# Patient Record
Sex: FEMALE | Race: WHITE | Employment: PART TIME | URBAN - METROPOLITAN AREA
[De-identification: names, ages, dates, MRNs, and addresses within clinical notes are randomized per-mention and may not be internally consistent; named-entity substitution may affect disease eponyms.]

---

## 2019-01-28 ENCOUNTER — HOSPITAL ENCOUNTER (OUTPATIENT)
Age: 29
Discharge: HOME | End: 2019-01-28
Payer: COMMERCIAL

## 2019-01-28 ENCOUNTER — ROUTINE PRENATAL (OUTPATIENT)
Dept: PERINATAL CARE | Age: 29
End: 2019-01-28
Payer: COMMERCIAL

## 2019-01-28 VITALS
DIASTOLIC BLOOD PRESSURE: 80 MMHG | SYSTOLIC BLOOD PRESSURE: 122 MMHG | HEART RATE: 97 BPM | BODY MASS INDEX: 35.61 KG/M2 | WEIGHT: 214 LBS

## 2019-01-28 DIAGNOSIS — O09.292: ICD-10-CM

## 2019-01-28 DIAGNOSIS — Q89.9 LYMPHATIC MALFORMATION: ICD-10-CM

## 2019-01-28 DIAGNOSIS — N96 RECURRENT PREGNANCY LOSS: ICD-10-CM

## 2019-01-28 PROCEDURE — 85610 PROTHROMBIN TIME: CPT

## 2019-01-28 PROCEDURE — 88230 TISSUE CULTURE LYMPHOCYTE: CPT

## 2019-01-28 PROCEDURE — 76817 TRANSVAGINAL US OBSTETRIC: CPT | Performed by: OBSTETRICS & GYNECOLOGY

## 2019-01-28 PROCEDURE — 76811 OB US DETAILED SNGL FETUS: CPT | Performed by: OBSTETRICS & GYNECOLOGY

## 2019-01-28 PROCEDURE — 88262 CHROMOSOME ANALYSIS 15-20: CPT

## 2019-01-28 PROCEDURE — 85613 RUSSELL VIPER VENOM DILUTED: CPT

## 2019-01-28 PROCEDURE — 85730 THROMBOPLASTIN TIME PARTIAL: CPT

## 2019-01-28 PROCEDURE — 86147 CARDIOLIPIN ANTIBODY EA IG: CPT

## 2019-01-28 PROCEDURE — 86146 BETA-2 GLYCOPROTEIN ANTIBODY: CPT

## 2019-01-28 RX ORDER — CLONAZEPAM 0.5 MG/1
0.5 TABLET ORAL DAILY
COMMUNITY

## 2019-01-28 RX ORDER — CETIRIZINE HYDROCHLORIDE 10 MG/1
10 TABLET ORAL DAILY
COMMUNITY

## 2019-01-30 LAB
ANTICARDIOLIPIN IGG ANTIBODY: 4 GPL (ref 0–14)
BETA-2 GLYCOPROTEIN 1 IGG ANTIBODY: 1 SGU (ref 0–20)
BETA-2 GLYCOPROTEIN 1 IGM ANTIBODY: 1 SMU (ref 0–20)
CARDIOLIPIN AB IGM: 10 MPL (ref 0–12)
DRVVT CONFIRMATION TEST: ABNORMAL RATIO
DRVVT SCREEN: 32 SEC (ref 33–44)
DRVVT,DIL: ABNORMAL SEC (ref 33–44)
HEXAGONAL PHOSPHOLIPID NEUTRALIZAT TEST: ABNORMAL
LUPUS ANTICOAG INTERP: ABNORMAL
PLT NEUTA: ABNORMAL
PT D: 13 SEC (ref 12–15.5)
PTT D: 39 SEC (ref 32–48)
PTT-D CORR REFLEX: ABNORMAL SEC (ref 32–48)
PTT-HEPARIN NEUTRALIZED: ABNORMAL SEC (ref 32–48)
REPTILASE TIME: ABNORMAL SEC
THROMBIN TIME: ABNORMAL SEC (ref 14.7–19.5)

## 2019-02-05 LAB
Lab: NORMAL
REPORT: NORMAL
THIS TEST SENT TO: NORMAL

## 2019-02-11 ENCOUNTER — ROUTINE PRENATAL (OUTPATIENT)
Dept: PERINATAL CARE | Age: 29
End: 2019-02-11
Payer: COMMERCIAL

## 2019-02-11 DIAGNOSIS — O09.292: ICD-10-CM

## 2019-02-11 PROCEDURE — 76815 OB US LIMITED FETUS(S): CPT | Performed by: OBSTETRICS & GYNECOLOGY

## 2020-10-20 LAB
C. TRACHOMATIS, EXTERNAL RESULT: NEGATIVE
N. GONORRHOEAE, EXTERNAL RESULT: NEGATIVE

## 2020-11-04 LAB
ABO, EXTERNAL RESULT: NORMAL
HEP B, EXTERNAL RESULT: NEGATIVE
HIV, EXTERNAL RESULT: NON REACTIVE
RH FACTOR, EXTERNAL RESULT: POSITIVE
RPR, EXTERNAL RESULT: NON REACTIVE
RUBELLA TITER, EXTERNAL RESULT: NORMAL

## 2021-01-27 LAB — GBS, EXTERNAL RESULT: NEGATIVE

## 2021-02-19 ENCOUNTER — NURSE ONLY (OUTPATIENT)
Dept: PRIMARY CARE CLINIC | Age: 31
End: 2021-02-19
Payer: COMMERCIAL

## 2021-02-19 DIAGNOSIS — Z20.822 SUSPECTED COVID-19 VIRUS INFECTION: Primary | ICD-10-CM

## 2021-02-19 PROCEDURE — 99211 OFF/OP EST MAY X REQ PHY/QHP: CPT | Performed by: NURSE PRACTITIONER

## 2021-02-19 NOTE — PROGRESS NOTES
Griselda Carson received a viral test for COVID-19. They were educated on isolation and quarantine as appropriate. For any symptoms, they were directed to seek care from their PCP, given contact information to establish with a doctor, directed to an urgent care or the emergency room.

## 2021-02-20 LAB — SARS-COV-2: NOT DETECTED

## 2021-02-23 ENCOUNTER — ANESTHESIA EVENT (OUTPATIENT)
Dept: LABOR AND DELIVERY | Age: 31
DRG: 560 | End: 2021-02-23
Payer: COMMERCIAL

## 2021-02-23 ENCOUNTER — HOSPITAL ENCOUNTER (INPATIENT)
Age: 31
LOS: 3 days | Discharge: HOME | DRG: 560 | End: 2021-02-26
Attending: OBSTETRICS & GYNECOLOGY | Admitting: OBSTETRICS & GYNECOLOGY
Payer: COMMERCIAL

## 2021-02-23 ENCOUNTER — ANESTHESIA (OUTPATIENT)
Dept: LABOR AND DELIVERY | Age: 31
DRG: 560 | End: 2021-02-23
Payer: COMMERCIAL

## 2021-02-23 LAB
ABO/RH: NORMAL
AMPHETAMINE SCREEN, URINE: ABNORMAL
ANTIBODY SCREEN: NORMAL
BARBITURATE SCREEN URINE: ABNORMAL
BASOPHILS ABSOLUTE: 0.1 K/UL (ref 0–0.2)
BASOPHILS RELATIVE PERCENT: 0.9 %
BENZODIAZEPINE SCREEN, URINE: ABNORMAL
BUPRENORPHINE URINE: POSITIVE
CANNABINOID SCREEN URINE: ABNORMAL
COCAINE METABOLITE SCREEN URINE: ABNORMAL
EOSINOPHILS ABSOLUTE: 0.6 K/UL (ref 0–0.6)
EOSINOPHILS RELATIVE PERCENT: 7.2 %
HCT VFR BLD CALC: 33.8 % (ref 36–48)
HEMOGLOBIN: 11.2 G/DL (ref 12–16)
LYMPHOCYTES ABSOLUTE: 2.1 K/UL (ref 1–5.1)
LYMPHOCYTES RELATIVE PERCENT: 23.7 %
Lab: ABNORMAL
MCH RBC QN AUTO: 25.5 PG (ref 26–34)
MCHC RBC AUTO-ENTMCNC: 33.2 G/DL (ref 31–36)
MCV RBC AUTO: 76.7 FL (ref 80–100)
METHADONE SCREEN, URINE: ABNORMAL
MONOCYTES ABSOLUTE: 0.9 K/UL (ref 0–1.3)
MONOCYTES RELATIVE PERCENT: 9.9 %
NEUTROPHILS ABSOLUTE: 5.1 K/UL (ref 1.7–7.7)
NEUTROPHILS RELATIVE PERCENT: 58.3 %
OPIATE SCREEN URINE: ABNORMAL
OXYCODONE URINE: ABNORMAL
PDW BLD-RTO: 15.1 % (ref 12.4–15.4)
PH UA: 6
PHENCYCLIDINE SCREEN URINE: ABNORMAL
PLATELET # BLD: 265 K/UL (ref 135–450)
PMV BLD AUTO: 7.6 FL (ref 5–10.5)
PROPOXYPHENE SCREEN: ABNORMAL
RBC # BLD: 4.41 M/UL (ref 4–5.2)
WBC # BLD: 8.8 K/UL (ref 4–11)

## 2021-02-23 PROCEDURE — 6370000000 HC RX 637 (ALT 250 FOR IP): Performed by: OBSTETRICS & GYNECOLOGY

## 2021-02-23 PROCEDURE — 2580000003 HC RX 258: Performed by: OBSTETRICS & GYNECOLOGY

## 2021-02-23 PROCEDURE — 51701 INSERT BLADDER CATHETER: CPT

## 2021-02-23 PROCEDURE — 80307 DRUG TEST PRSMV CHEM ANLYZR: CPT

## 2021-02-23 PROCEDURE — 6360000002 HC RX W HCPCS: Performed by: OBSTETRICS & GYNECOLOGY

## 2021-02-23 PROCEDURE — 86850 RBC ANTIBODY SCREEN: CPT

## 2021-02-23 PROCEDURE — 86780 TREPONEMA PALLIDUM: CPT

## 2021-02-23 PROCEDURE — 3E033VJ INTRODUCTION OF OTHER HORMONE INTO PERIPHERAL VEIN, PERCUTANEOUS APPROACH: ICD-10-PCS | Performed by: OBSTETRICS & GYNECOLOGY

## 2021-02-23 PROCEDURE — 1220000000 HC SEMI PRIVATE OB R&B

## 2021-02-23 PROCEDURE — 86900 BLOOD TYPING SEROLOGIC ABO: CPT

## 2021-02-23 PROCEDURE — 3700000025 EPIDURAL BLOCK: Performed by: ANESTHESIOLOGY

## 2021-02-23 PROCEDURE — 86901 BLOOD TYPING SEROLOGIC RH(D): CPT

## 2021-02-23 PROCEDURE — 85025 COMPLETE CBC W/AUTO DIFF WBC: CPT

## 2021-02-23 PROCEDURE — 2500000003 HC RX 250 WO HCPCS: Performed by: NURSE ANESTHETIST, CERTIFIED REGISTERED

## 2021-02-23 RX ORDER — SODIUM CHLORIDE 0.9 % (FLUSH) 0.9 %
10 SYRINGE (ML) INJECTION EVERY 12 HOURS SCHEDULED
Status: DISCONTINUED | OUTPATIENT
Start: 2021-02-23 | End: 2021-02-24

## 2021-02-23 RX ORDER — BUPIVACAINE HYDROCHLORIDE 2.5 MG/ML
INJECTION, SOLUTION EPIDURAL; INFILTRATION; INTRACAUDAL PRN
Status: DISCONTINUED | OUTPATIENT
Start: 2021-02-23 | End: 2021-02-24 | Stop reason: SDUPTHER

## 2021-02-23 RX ORDER — FERROUS SULFATE 325(65) MG
325 TABLET ORAL
COMMUNITY

## 2021-02-23 RX ORDER — DOCUSATE SODIUM 100 MG/1
100 CAPSULE, LIQUID FILLED ORAL 2 TIMES DAILY
Status: DISCONTINUED | OUTPATIENT
Start: 2021-02-23 | End: 2021-02-24

## 2021-02-23 RX ORDER — ONDANSETRON HYDROCHLORIDE 8 MG/1
8 TABLET, FILM COATED ORAL EVERY 8 HOURS PRN
COMMUNITY

## 2021-02-23 RX ORDER — SODIUM CHLORIDE, SODIUM LACTATE, POTASSIUM CHLORIDE, CALCIUM CHLORIDE 600; 310; 30; 20 MG/100ML; MG/100ML; MG/100ML; MG/100ML
INJECTION, SOLUTION INTRAVENOUS CONTINUOUS
Status: DISCONTINUED | OUTPATIENT
Start: 2021-02-23 | End: 2021-02-24

## 2021-02-23 RX ORDER — ONDANSETRON 2 MG/ML
4 INJECTION INTRAMUSCULAR; INTRAVENOUS EVERY 6 HOURS PRN
Status: DISCONTINUED | OUTPATIENT
Start: 2021-02-23 | End: 2021-02-24

## 2021-02-23 RX ORDER — EPHEDRINE SULFATE 50 MG/ML
INJECTION INTRAVENOUS PRN
Status: DISCONTINUED | OUTPATIENT
Start: 2021-02-23 | End: 2021-02-24 | Stop reason: SDUPTHER

## 2021-02-23 RX ORDER — EPHEDRINE SULFATE 50 MG/ML
INJECTION INTRAVENOUS
Status: COMPLETED
Start: 2021-02-23 | End: 2021-02-23

## 2021-02-23 RX ORDER — SODIUM CHLORIDE 0.9 % (FLUSH) 0.9 %
10 SYRINGE (ML) INJECTION PRN
Status: DISCONTINUED | OUTPATIENT
Start: 2021-02-23 | End: 2021-02-24

## 2021-02-23 RX ORDER — BUPRENORPHINE HYDROCHLORIDE 8 MG/1
12 TABLET SUBLINGUAL DAILY
COMMUNITY

## 2021-02-23 RX ADMIN — SODIUM CHLORIDE, POTASSIUM CHLORIDE, SODIUM LACTATE AND CALCIUM CHLORIDE: 600; 310; 30; 20 INJECTION, SOLUTION INTRAVENOUS at 18:19

## 2021-02-23 RX ADMIN — EPHEDRINE SULFATE 25 MG: 50 INJECTION INTRAVENOUS at 22:16

## 2021-02-23 RX ADMIN — SODIUM CHLORIDE, POTASSIUM CHLORIDE, SODIUM LACTATE AND CALCIUM CHLORIDE: 600; 310; 30; 20 INJECTION, SOLUTION INTRAVENOUS at 15:16

## 2021-02-23 RX ADMIN — Medication 25 MCG: at 12:35

## 2021-02-23 RX ADMIN — Medication 15 ML/HR: at 18:24

## 2021-02-23 RX ADMIN — SODIUM CHLORIDE, POTASSIUM CHLORIDE, SODIUM LACTATE AND CALCIUM CHLORIDE: 600; 310; 30; 20 INJECTION, SOLUTION INTRAVENOUS at 18:54

## 2021-02-23 RX ADMIN — ONDANSETRON 4 MG: 2 INJECTION INTRAMUSCULAR; INTRAVENOUS at 20:41

## 2021-02-23 RX ADMIN — EPHEDRINE SULFATE 25 MG: 50 INJECTION INTRAVENOUS at 22:17

## 2021-02-23 RX ADMIN — Medication 25 MCG: at 08:29

## 2021-02-23 RX ADMIN — BUPIVACAINE HYDROCHLORIDE 5 ML: 2.5 INJECTION, SOLUTION EPIDURAL; INFILTRATION; INTRACAUDAL; PERINEURAL at 18:19

## 2021-02-23 RX ADMIN — Medication 1 MILLI-UNITS/MIN: at 16:54

## 2021-02-23 RX ADMIN — SODIUM CHLORIDE, POTASSIUM CHLORIDE, SODIUM LACTATE AND CALCIUM CHLORIDE: 600; 310; 30; 20 INJECTION, SOLUTION INTRAVENOUS at 07:50

## 2021-02-23 RX ADMIN — SODIUM CHLORIDE, POTASSIUM CHLORIDE, SODIUM LACTATE AND CALCIUM CHLORIDE: 600; 310; 30; 20 INJECTION, SOLUTION INTRAVENOUS at 21:43

## 2021-02-23 ASSESSMENT — PAIN DESCRIPTION - DESCRIPTORS
DESCRIPTORS: CRAMPING

## 2021-02-23 ASSESSMENT — PAIN - FUNCTIONAL ASSESSMENT: PAIN_FUNCTIONAL_ASSESSMENT: 0-10

## 2021-02-23 ASSESSMENT — PAIN DESCRIPTION - RADICULAR PAIN: RADICULAR_PAIN: ABSENT

## 2021-02-23 NOTE — ANESTHESIA PROCEDURE NOTES
Epidural Block    Patient location during procedure: OB  Start time: 2/23/2021 6:03 PM  End time: 2/23/2021 6:24 PM  Reason for block: labor epidural  Staffing  Performed: resident/CRNA   Resident/CRNA: DELMIS Boston CRNA  Preanesthetic Checklist  Completed: patient identified, IV checked, site marked, risks and benefits discussed, surgical consent, monitors and equipment checked, pre-op evaluation, timeout performed, anesthesia consent given, oxygen available and patient being monitored  Epidural  Patient position: sitting  Prep: ChloraPrep  Patient monitoring: continuous pulse ox and frequent blood pressure checks  Approach: midline  Location: lumbar (1-5) (L3-4)  Injection technique: BINH saline  Provider prep: mask and sterile gloves  Needle  Needle type: Tuohy   Needle gauge: 17 G  Needle length: 3.5 in  Catheter type: side hole  Catheter size: 19 G  Catheter at skin depth: 11 cm  Test dose: negative  Assessment  Sensory level: T8  Hemodynamics: stable  Attempts: 1  Additional Notes  Sitting, Sterile prep/drape, 1%Xylo at L3-4, 17ga Tuohy with BINH, 25ga Pencan for w/+CSF for DPE, Pencan removed, Catheter inserted, negative test dose, sterile dressing applied.

## 2021-02-23 NOTE — ANESTHESIA PRE PROCEDURE
Department of Anesthesiology  Preprocedure Note       Name:  [facility]   Age:  31 y.o.  :  1990                                          MRN:  [id number]         Date:  2021      Surgeon: * No surgeons listed *    Procedure: * No procedures listed *    Medications prior to admission:   Prior to Admission medications    Medication Sig Start Date End Date Taking? Authorizing Provider   ferrous sulfate (IRON 325) 325 (65 Fe) MG tablet Take 325 mg by mouth daily (with breakfast)   Yes Historical Provider, MD   buprenorphine (SUBUTEX) 8 MG SUBL SL tablet Place 12 mg under the tongue daily. Yes Historical Provider, MD   ondansetron (ZOFRAN) 8 MG tablet Take 8 mg by mouth every 8 hours as needed for Nausea or Vomiting   Yes Historical Provider, MD   clonazePAM (KLONOPIN) 0.5 MG tablet Take 0.5 mg by mouth daily. Enrrique Cyr     Historical Provider, MD   cetirizine (ZYRTEC) 10 MG tablet Take 10 mg by mouth daily    Historical Provider, MD       Current medications:    Current Facility-Administered Medications   Medication Dose Route Frequency Provider Last Rate Last Admin    lactated ringers infusion   Intravenous Continuous Clarence Weeks  mL/hr at 21 1819 New Bag at 21 181    sodium chloride flush 0.9 % injection 10 mL  10 mL Intravenous 2 times per day Clarence Weeks MD        sodium chloride flush 0.9 % injection 10 mL  10 mL Intravenous PRN Clarence Weeks MD        oxytocin (PITOCIN) 10 unit bolus from the bag  10 Units Intravenous PRN Clarence Weeks MD        And    oxytocin (PITOCIN) 30 units in 500 mL infusion  87.3 ciara-units/min Intravenous PRN Clarence Weeks MD        ondansetron [facility]) injection 4 mg  4 mg Intravenous Q6H PRN Clarence Weeks MD        docusate sodium (COLACE) capsule 100 mg  100 mg Oral BID Clarence Weeks MD  miSOPROStol (CYTOTEC) pre-split tablet TABS 25 mcg  25 mcg Vaginal Q4H Beth Saucedo MD   25 mcg at 21 1235    oxytocin (PITOCIN) 30 units in 500 mL infusion  1-20 ciara-units/min Intravenous Continuous Beth Saucedo MD 1 mL/hr at 21 1654 1 ciara-units/min at 21 1654     Facility-Administered Medications Ordered in Other Encounters   Medication Dose Route Frequency Provider Last Rate Last Admin    bupivacaine (PF) (MARCAINE) 0.25 % injection    PRN Tyra Adkins APRN - CRNA   5 mL at 21 1819    sodium chloride 0.9 % 200 mL with fentaNYL 500 mcg, bupivacaine 0.5% 50 mL (OB) epidural    Continuous PRN Tyra Adkins APRN - CRNA 15 mL/hr at 21 1824 15 mL/hr at 21 1824       Allergies:     Allergies   Allergen Reactions    Keflex [Cephalexin]     Sulfasalazine Other (See Comments)    Sulfa Antibiotics Rash    Sulfacetamide Sodium Rash       Problem List:    Patient Active Problem List   Diagnosis Code    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     (normal spontaneous vaginal delivery) O80    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, currently pregnant, 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    Lymphatic malformation Q89.9    Recurrent pregnancy loss N96       Past Medical History:        Diagnosis Date    Abnormal Pap smear of cervix     Autoimmune disorder ([facility]     fibromyagia    Depression     Drug abuse, opioid type ([facility]     2 years clean    Fibromyalgia     Former smoker     Prolonged rupture of membranes     >28 hours    Shoulder dystocia 2015    Two vessel cord     MFM following        Past Surgical History:        Procedure Laterality Date    CHOLECYSTECTOMY Bilateral 2012    TONSILLECTOMY      UPPER GASTROINTESTINAL ENDOSCOPY Bilateral 13    normal    UVULECTOMY      WISDOM TOOTH EXTRACTION         Social History:    Social History     Tobacco Use    Smoking status: Current Every Day Smoker     Packs/day: 0.25  Smokeless tobacco: Never Used   Substance Use Topics    Alcohol use: No     Alcohol/week: 0.0 standard drinks     Comment: occ                                Ready to quit: Not Answered  Counseling given: Not Answered      Vital Signs (Current):   Vitals:    02/23/21 1149 02/23/21 1430 02/23/21 1730 02/23/21 1819   BP: (!) 111/57 (!) 98/55 114/63 104/71   Pulse: 84 75 78 73   Resp: 18 18 18 18   Temp: 36.6 °C (97.8 °F) 36.8 °C (98.2 °F) 36.6 °C (97.8 °F)    TempSrc: Oral Oral Axillary    Weight:       Height:                                                  BP Readings from Last 3 Encounters:   02/23/21 104/71   01/28/19 122/80   01/02/16 98/58       NPO Status: Time of last liquid consumption: 0700                        Time of last solid consumption: 0700                        Date of last liquid consumption: 02/23/21                        Date of last solid food consumption: 02/23/21    BMI:   Wt Readings from Last 3 Encounters:   02/23/21 242 lb (109.8 kg)   01/28/19 214 lb (97.1 kg)   12/30/15 220 lb (99.8 kg)     Body mass index is 40.27 kg/m². CBC:   Lab Results   Component Value Date    WBC 8.8 02/23/2021    RBC 4.41 02/23/2021    HGB 11.2 02/23/2021    HCT 33.8 02/23/2021    MCV 76.7 02/23/2021    RDW 15.1 02/23/2021     02/23/2021       CMP:   Lab Results   Component Value Date     11/27/2013    K 3.7 11/27/2013     11/27/2013    CO2 27 11/27/2013    BUN 10 11/27/2013    CREATININE 0.9 11/27/2013    GFRAA >60 11/27/2013    AGRATIO 1.5 11/26/2013    LABGLOM >60 11/27/2013    GLUCOSE 98 11/27/2013    PROT 7.3 11/26/2013    CALCIUM 8.7 11/27/2013    BILITOT 0.44 11/26/2013    ALKPHOS 51 11/26/2013    AST 16 11/26/2013    ALT 12 11/26/2013       POC Tests: No results for input(s): POCGLU, POCNA, POCK, POCCL, POCBUN, POCHEMO, POCHCT in the last 72 hours.     Coags: No results found for: PROTIME, INR, APTT    HCG (If Applicable):   Lab Results   Component Value Date PREGTESTUR NEGATIVE 11/26/2013        ABGs: No results found for: PHART, PO2ART, TAK2ZAA, ANN4BXE, BEART, J0MRMCJD     Type & Screen (If Applicable):  No results found for: LABABO, LABRH    Drug/Infectious Status (If Applicable):  No results found for: HIV, HEPCAB    COVID-19 Screening (If Applicable):   Lab Results   Component Value Date    COVID19 Not Detected 02/19/2021         Anesthesia Evaluation   no history of anesthetic complications:   Airway: Mallampati: III  TM distance: >3 FB   Neck ROM: full  Mouth opening: > = 3 FB Dental: normal exam         Pulmonary:Negative Pulmonary ROS and normal exam                               Cardiovascular:Negative CV ROS                      Neuro/Psych:   (+) psychiatric history (Depression, Past history of drug abuse):            GI/Hepatic/Renal: Neg GI/Hepatic/Renal ROS            Endo/Other:                      ROS comment: Fibromyalgia   Abdominal:   (+) obese,         Vascular: negative vascular ROS. Anesthesia Plan      epidural     ASA 3 - emergent             Anesthetic plan and risks discussed with patient and spouse. Plan discussed with attending.                   DELMIS Oseguera - CRNA   2/23/2021

## 2021-02-23 NOTE — PROGRESS NOTES
Department of Obstetrics and Gynecology  Labor and Delivery   Attending Progress Note      SUBJECTIVE:  Pt with increasing contractions    OBJECTIVE:      Fetal heart rate:       Baseline Heart Rate:  140        Accelerations:  present       Long Term Variability:  moderate       Decelerations:  absent         Contraction frequency: 6 minutes    Membranes:  Intact    Cervix:         Dilation:  2 cm         Effacement:  50%         Station:  -2         Position:  anterior             ASSESSMENT & PLAN:    Continue pitocin induction

## 2021-02-23 NOTE — H&P
resource strain: Not on file    Food insecurity     Worry: Not on file     Inability: Not on file    Transportation needs     Medical: Not on file     Non-medical: Not on file   Tobacco Use    Smoking status: Current Every Day Smoker     Packs/day: 0.25    Smokeless tobacco: Never Used   Substance and Sexual Activity    Alcohol use: No     Alcohol/week: 0.0 standard drinks     Comment: occ    Drug use: Not Currently    Sexual activity: Yes     Partners: Male   Lifestyle    Physical activity     Days per week: Not on file     Minutes per session: Not on file    Stress: Not on file   Relationships    Social connections     Talks on phone: Not on file     Gets together: Not on file     Attends Yazidism service: Not on file     Active member of club or organization: Not on file     Attends meetings of clubs or organizations: Not on file     Relationship status: Not on file    Intimate partner violence     Fear of current or ex partner: Not on file     Emotionally abused: Not on file     Physically abused: Not on file     Forced sexual activity: Not on file   Other Topics Concern    Not on file   Social History Narrative    Not on file     Family History:       Problem Relation Age of Onset    Arthritis Mother     Spont Abortions Maternal Grandmother     Alcohol Abuse Maternal Aunt     Arthritis Maternal Aunt     Heart Disease Maternal Aunt     High Blood Pressure Maternal Aunt     Kidney Disease Maternal Aunt     Osteoporosis Maternal Aunt     Substance Abuse Maternal Aunt     Diabetes Paternal Aunt     High Blood Pressure Paternal Aunt     Diabetes Paternal Uncle      Medications Prior to Admission:  Medications Prior to Admission: ferrous sulfate (IRON 325) 325 (65 Fe) MG tablet, Take 325 mg by mouth daily (with breakfast)  buprenorphine (SUBUTEX) 8 MG SUBL SL tablet, Place 12 mg under the tongue daily.   ondansetron (ZOFRAN) 8 MG tablet, Take 8 mg by mouth every 8 hours as needed for Nausea or Vomiting  clonazePAM (KLONOPIN) 0.5 MG tablet, Take 0.5 mg by mouth daily. .  cetirizine (ZYRTEC) 10 MG tablet, Take 10 mg by mouth daily    REVIEW OF SYSTEMS:    wnl    PHYSICAL EXAM:  Vitals:    02/23/21 0725 02/23/21 0745   BP: 132/61    Pulse: 84    Resp: 18    Temp: 98.2 °F (36.8 °C)    TempSrc: Oral    Weight:  242 lb (109.8 kg)   Height:  5' 5\" (1.651 m)     General appearance:  awake, alert, cooperative, no apparent distress, and appears stated age  Neurologic:  Awake, alert, oriented to name, place and time. Lungs:  No increased work of breathing, good air exchange  Abdomen:  Soft, non tender, gravid, consistent with her gestational age, EFW by Leopold's maneuver was 4000   Fetal heart rate:  Reassuring.   Pelvis:  Adequate pelvis  Cervix: 1/50/-3  Contraction frequency:  none  Membranes:  Intact    Labs:   CBC:   Lab Results   Component Value Date    WBC 8.8 02/23/2021    RBC 4.41 02/23/2021    HGB 11.2 02/23/2021    HCT 33.8 02/23/2021    MCV 76.7 02/23/2021    MCH 25.5 02/23/2021    MCHC 33.2 02/23/2021    RDW 15.1 02/23/2021     02/23/2021    MPV 7.6 02/23/2021       ASSESSMENT AND PLAN:    Labor: Admit, anticipate normal delivery, routine labor orders  Fetus: Reassuring  GBS: No  Other: cytotec/pitocin

## 2021-02-24 PROBLEM — O48.0 POST-DATES PREGNANCY: Status: ACTIVE | Noted: 2021-02-24

## 2021-02-24 LAB — TOTAL SYPHILLIS IGG/IGM: NORMAL

## 2021-02-24 PROCEDURE — 2580000003 HC RX 258: Performed by: OBSTETRICS & GYNECOLOGY

## 2021-02-24 PROCEDURE — 6360000002 HC RX W HCPCS: Performed by: OBSTETRICS & GYNECOLOGY

## 2021-02-24 PROCEDURE — 2500000003 HC RX 250 WO HCPCS: Performed by: NURSE ANESTHETIST, CERTIFIED REGISTERED

## 2021-02-24 PROCEDURE — 3E0P7VZ INTRODUCTION OF HORMONE INTO FEMALE REPRODUCTIVE, VIA NATURAL OR ARTIFICIAL OPENING: ICD-10-PCS | Performed by: OBSTETRICS & GYNECOLOGY

## 2021-02-24 PROCEDURE — 1220000000 HC SEMI PRIVATE OB R&B

## 2021-02-24 PROCEDURE — 3700000025 EPIDURAL BLOCK: Performed by: ANESTHESIOLOGY

## 2021-02-24 PROCEDURE — 10907ZC DRAINAGE OF AMNIOTIC FLUID, THERAPEUTIC FROM PRODUCTS OF CONCEPTION, VIA NATURAL OR ARTIFICIAL OPENING: ICD-10-PCS | Performed by: OBSTETRICS & GYNECOLOGY

## 2021-02-24 PROCEDURE — 6370000000 HC RX 637 (ALT 250 FOR IP): Performed by: OBSTETRICS & GYNECOLOGY

## 2021-02-24 PROCEDURE — 7200000001 HC VAGINAL DELIVERY

## 2021-02-24 RX ORDER — SODIUM CHLORIDE, SODIUM LACTATE, POTASSIUM CHLORIDE, CALCIUM CHLORIDE 600; 310; 30; 20 MG/100ML; MG/100ML; MG/100ML; MG/100ML
INJECTION, SOLUTION INTRAVENOUS CONTINUOUS
Status: DISCONTINUED | OUTPATIENT
Start: 2021-02-24 | End: 2021-02-26 | Stop reason: HOSPADM

## 2021-02-24 RX ORDER — SODIUM CHLORIDE 0.9 % (FLUSH) 0.9 %
10 SYRINGE (ML) INJECTION EVERY 12 HOURS SCHEDULED
Status: DISCONTINUED | OUTPATIENT
Start: 2021-02-24 | End: 2021-02-26 | Stop reason: HOSPADM

## 2021-02-24 RX ORDER — KETOROLAC TROMETHAMINE 30 MG/ML
30 INJECTION, SOLUTION INTRAMUSCULAR; INTRAVENOUS EVERY 6 HOURS
Status: COMPLETED | OUTPATIENT
Start: 2021-02-24 | End: 2021-02-25

## 2021-02-24 RX ORDER — EPHEDRINE SULFATE 50 MG/ML
INJECTION INTRAVENOUS
Status: COMPLETED
Start: 2021-02-24 | End: 2021-02-24

## 2021-02-24 RX ORDER — BUPIVACAINE HYDROCHLORIDE 5 MG/ML
INJECTION, SOLUTION EPIDURAL; INTRACAUDAL PRN
Status: DISCONTINUED | OUTPATIENT
Start: 2021-02-24 | End: 2021-02-24 | Stop reason: SDUPTHER

## 2021-02-24 RX ORDER — IBUPROFEN 600 MG/1
600 TABLET ORAL EVERY 6 HOURS PRN
Status: DISCONTINUED | OUTPATIENT
Start: 2021-02-25 | End: 2021-02-26 | Stop reason: HOSPADM

## 2021-02-24 RX ORDER — SIMETHICONE 80 MG
80 TABLET,CHEWABLE ORAL EVERY 6 HOURS PRN
Status: DISCONTINUED | OUTPATIENT
Start: 2021-02-24 | End: 2021-02-26 | Stop reason: HOSPADM

## 2021-02-24 RX ORDER — LIDOCAINE HYDROCHLORIDE 20 MG/ML
INJECTION, SOLUTION EPIDURAL; INFILTRATION; INTRACAUDAL; PERINEURAL PRN
Status: DISCONTINUED | OUTPATIENT
Start: 2021-02-24 | End: 2021-02-24 | Stop reason: SDUPTHER

## 2021-02-24 RX ORDER — ACETAMINOPHEN 325 MG/1
650 TABLET ORAL EVERY 4 HOURS PRN
Status: DISCONTINUED | OUTPATIENT
Start: 2021-02-24 | End: 2021-02-26 | Stop reason: HOSPADM

## 2021-02-24 RX ORDER — SODIUM CHLORIDE 0.9 % (FLUSH) 0.9 %
10 SYRINGE (ML) INJECTION PRN
Status: DISCONTINUED | OUTPATIENT
Start: 2021-02-24 | End: 2021-02-26 | Stop reason: HOSPADM

## 2021-02-24 RX ORDER — CLONAZEPAM 0.5 MG/1
0.5 TABLET ORAL DAILY
Status: DISCONTINUED | OUTPATIENT
Start: 2021-02-24 | End: 2021-02-26 | Stop reason: HOSPADM

## 2021-02-24 RX ORDER — DOCUSATE SODIUM 100 MG/1
100 CAPSULE, LIQUID FILLED ORAL 2 TIMES DAILY PRN
Status: DISCONTINUED | OUTPATIENT
Start: 2021-02-24 | End: 2021-02-26 | Stop reason: HOSPADM

## 2021-02-24 RX ORDER — FERROUS SULFATE 325(65) MG
325 TABLET ORAL 2 TIMES DAILY WITH MEALS
Status: DISCONTINUED | OUTPATIENT
Start: 2021-02-24 | End: 2021-02-26 | Stop reason: HOSPADM

## 2021-02-24 RX ORDER — LANOLIN 100 %
OINTMENT (GRAM) TOPICAL PRN
Status: DISCONTINUED | OUTPATIENT
Start: 2021-02-24 | End: 2021-02-26 | Stop reason: HOSPADM

## 2021-02-24 RX ORDER — ONDANSETRON 2 MG/ML
4 INJECTION INTRAMUSCULAR; INTRAVENOUS EVERY 6 HOURS PRN
Status: DISCONTINUED | OUTPATIENT
Start: 2021-02-24 | End: 2021-02-26 | Stop reason: HOSPADM

## 2021-02-24 RX ADMIN — ONDANSETRON 4 MG: 2 INJECTION INTRAMUSCULAR; INTRAVENOUS at 08:42

## 2021-02-24 RX ADMIN — SODIUM CHLORIDE, POTASSIUM CHLORIDE, SODIUM LACTATE AND CALCIUM CHLORIDE: 600; 310; 30; 20 INJECTION, SOLUTION INTRAVENOUS at 02:19

## 2021-02-24 RX ADMIN — SODIUM CHLORIDE, POTASSIUM CHLORIDE, SODIUM LACTATE AND CALCIUM CHLORIDE: 600; 310; 30; 20 INJECTION, SOLUTION INTRAVENOUS at 11:50

## 2021-02-24 RX ADMIN — KETOROLAC TROMETHAMINE 30 MG: 30 INJECTION, SOLUTION INTRAMUSCULAR at 16:37

## 2021-02-24 RX ADMIN — BUPIVACAINE HYDROCHLORIDE 3 ML: 2.5 INJECTION, SOLUTION EPIDURAL; INFILTRATION; INTRACAUDAL; PERINEURAL at 08:17

## 2021-02-24 RX ADMIN — BUPRENORPHINE HCL 12 MG: 8 TABLET SUBLINGUAL at 09:39

## 2021-02-24 RX ADMIN — BUPIVACAINE HYDROCHLORIDE 4 ML: 2.5 INJECTION, SOLUTION EPIDURAL; INFILTRATION; INTRACAUDAL; PERINEURAL at 03:47

## 2021-02-24 RX ADMIN — BUPIVACAINE HYDROCHLORIDE 3 ML: 5 INJECTION, SOLUTION EPIDURAL; INTRACAUDAL; PERINEURAL at 08:17

## 2021-02-24 RX ADMIN — EPHEDRINE SULFATE 25 MG: 50 INJECTION INTRAVENOUS at 05:16

## 2021-02-24 RX ADMIN — LIDOCAINE HYDROCHLORIDE 2 ML: 20 INJECTION, SOLUTION EPIDURAL; INFILTRATION; INTRACAUDAL; PERINEURAL at 08:17

## 2021-02-24 RX ADMIN — DOCUSATE SODIUM 100 MG: 100 CAPSULE, LIQUID FILLED ORAL at 20:51

## 2021-02-24 RX ADMIN — EPHEDRINE SULFATE 25 MG: 50 INJECTION INTRAVENOUS at 05:15

## 2021-02-24 RX ADMIN — EPHEDRINE SULFATE 25 MG: 50 INJECTION INTRAVENOUS at 11:40

## 2021-02-24 RX ADMIN — Medication 10 ML: at 20:52

## 2021-02-24 RX ADMIN — ACETAMINOPHEN 650 MG: 325 TABLET ORAL at 20:51

## 2021-02-24 RX ADMIN — BUPIVACAINE HYDROCHLORIDE 5 ML: 5 INJECTION, SOLUTION EPIDURAL; INTRACAUDAL; PERINEURAL at 13:46

## 2021-02-24 RX ADMIN — BUPIVACAINE HYDROCHLORIDE 4 ML: 5 INJECTION, SOLUTION EPIDURAL; INTRACAUDAL; PERINEURAL at 03:47

## 2021-02-24 RX ADMIN — SODIUM CHLORIDE, POTASSIUM CHLORIDE, SODIUM LACTATE AND CALCIUM CHLORIDE: 600; 310; 30; 20 INJECTION, SOLUTION INTRAVENOUS at 08:44

## 2021-02-24 RX ADMIN — KETOROLAC TROMETHAMINE 30 MG: 30 INJECTION, SOLUTION INTRAMUSCULAR at 23:06

## 2021-02-24 ASSESSMENT — PAIN SCALES - GENERAL
PAINLEVEL_OUTOF10: 5
PAINLEVEL_OUTOF10: 3

## 2021-02-24 NOTE — BRIEF OP NOTE
Brief Postoperative Note      Patient: Caitlin [facility]  YOB: 1990  MRN: [id number]    Date of Procedure:     H/o Subutex usage  IOL 21 per MF-postdates   VFI  Apgars 7/9  Placenta spon.  No epis/lac   cc  \"Paige Shin\"      Electronically signed by Mahamed Osborn MD on 2021 at 3:03 PM

## 2021-02-24 NOTE — PROGRESS NOTES
[address] notified by this RN that patient continues to have hypotension w/ nausea despite receiving ordered antiemetic. CRNA orders pt to receive 500 cc LR bolus, and to notify how patient feels after administration. Will continue to monitor.

## 2021-02-24 NOTE — PROGRESS NOTES
[address] notified by this RN that pt continues to have hypotension accompanied w/nausea despite fluid bolus. CRNA states he will be in to see pt.

## 2021-02-24 NOTE — PROGRESS NOTES
notified by this RN of recurrent variable fhr decelerations. MD states he will be in to assess pt. No orders received at this time. Will continue to monitor.

## 2021-02-24 NOTE — PROGRESS NOTES
notified by this RN of E 2/70/-3. MD would like to continue with current plan of care. No orders received. Will continue to monitor.

## 2021-02-24 NOTE — PLAN OF CARE
Haydee Molina RN  Outcome: Ongoing  2021 0407 by Enedelia Alfaro RN  Outcome: Ongoing     Problem:  Screening:  Goal: Ability to make informed decisions regarding treatment has improved  Description: Ability to make informed decisions regarding treatment has improved  2021 1013 by Bakari Donovan RN  Outcome: Ongoing  2021 0407 by Enedelia Alfaro RN  Outcome: Ongoing     Problem: Pain - Acute:  Goal: Pain level will decrease  Description: Pain level will decrease  2021 1013 by Bakari Donovan RN  Outcome: Ongoing  2021 0407 by Enedelia Alfaro RN  Outcome: Ongoing  Goal: Able to cope with pain  Description: Able to cope with pain  2021 1013 by Bakari Donovan RN  Outcome: Ongoing  2021 0407 by Enedelia Alfaro RN  Outcome: Ongoing     Problem: Tissue Perfusion - Uteroplacental, Altered:  Goal: Absence of abnormal fetal heart rate pattern  Description: Absence of abnormal fetal heart rate pattern  2021 1013 by Bakari Donovan RN  Outcome: Ongoing  2021 0407 by Enedelia Alfaro RN  Outcome: Ongoing     Problem: Urinary Retention:  Goal: Experiences of bladder distention will decrease  Description: Experiences of bladder distention will decrease  2021 1013 by Bakari Donovan RN  Outcome: Ongoing  2021 0407 by Enedelia Alfaro RN  Outcome: Ongoing  Goal: Urinary elimination within specified parameters  Description: Urinary elimination within specified parameters  2021 1013 by Bakari Donovan RN  Outcome: Ongoing  2021 0407 by Enedelia Alfaro RN  Outcome: Ongoing     Problem: Pain:  Goal: Pain level will decrease  Description: Pain level will decrease  2021 1013 by Bakari Donovan RN  Outcome: Ongoing  2021 0407 by Enedelia Alfaro RN  Outcome: Ongoing  Goal: Control of acute pain  Description: Control of acute pain  2021 1013 by Bakari Donovan RN  Outcome: Ongoing  2021 0407 by Enedelia Alfaro RN  Outcome: Ongoing  Goal: Control of chronic pain  Description: Control of chronic pain  2/24/2021 1013 by Michael Red RN  Outcome: Ongoing  2/24/2021 0407 by Yesenia Norris RN  Outcome: Ongoing

## 2021-02-24 NOTE — ANESTHESIA PROCEDURE NOTES
Epidural Block    Patient location during procedure: OB  Start time: 2/24/2021 10:41 AM  End time: 2/24/2021 10:52 AM  Reason for block: labor epidural  Staffing  Performed: resident/CRNA   Anesthesiologist: Shama Carlos MD  Resident/CRNA: DELMIS Carter CRNA  Preanesthetic Checklist  Completed: patient identified, IV checked, site marked, risks and benefits discussed, monitors and equipment checked, pre-op evaluation, timeout performed, anesthesia consent given, oxygen available and patient being monitored  Epidural  Patient position: sitting  Prep: Betadine  Patient monitoring: cardiac monitor, continuous pulse ox and frequent blood pressure checks  Approach: midline  Location: lumbar (1-5)  Injection technique: BINH saline  Provider prep: mask and sterile gloves  Needle  Needle type: Tuohy   Needle gauge: 17 G  Needle length: 3.5 in  Catheter type: side hole  Catheter size: 19 G (20 G)  Test dose: negative (3 + 2 cc of 1.5 % xylocaine with epi)  Assessment  Sensory level: T8  Events: failed epidural  Hemodynamics: stable  Attempts: 1  Additional Notes  Pt. prepped and draped in sterile fashion. Skin wheal with 1% lidocaine. 17ga touhy needle to BINH. Needle withdrawn and catheter threaded. Negative test dose. Catheter secured with sterile dressing. Epidural replacement due to failed epi

## 2021-02-24 NOTE — PROGRESS NOTES
1 Pt's mother called out stating she is in a lot more pain with contractions again  1030 pt repositioned to R side  1037 Pt states pain feels even worse and requesting new epidural

## 2021-02-24 NOTE — PROGRESS NOTES
Department of Obstetrics and Gynecology  Labor and Delivery   Attending Progress Note      SUBJECTIVE:  Pt feeling more contractions    OBJECTIVE:      Fetal heart rate:       Baseline Heart Rate:  150        Accelerations:  present       Long Term Variability:  moderate       Decelerations:  Intermittent variable         Contraction frequency: 4 minutes    Membranes:  Ruptured clear fluid    Cervix:         Dilation:  4 cm         Effacement:  75%         Station:  -2         Position:  anterior             ASSESSMENT & PLAN:    Continue pitocin induction

## 2021-02-24 NOTE — PROGRESS NOTES
This RN notified Citlali Olivo of hypotension w/nausea and that 500 cc lr iv bolus started. CRNA states he will be in to see pt. Will continue to monitor.

## 2021-02-25 LAB
HCT VFR BLD CALC: 30.8 % (ref 36–48)
HEMOGLOBIN: 10.2 G/DL (ref 12–16)
MCH RBC QN AUTO: 25.8 PG (ref 26–34)
MCHC RBC AUTO-ENTMCNC: 33.2 G/DL (ref 31–36)
MCV RBC AUTO: 77.7 FL (ref 80–100)
PDW BLD-RTO: 15.1 % (ref 12.4–15.4)
PLATELET # BLD: 244 K/UL (ref 135–450)
PMV BLD AUTO: 7.5 FL (ref 5–10.5)
RBC # BLD: 3.97 M/UL (ref 4–5.2)
WBC # BLD: 11.3 K/UL (ref 4–11)

## 2021-02-25 PROCEDURE — 36415 COLL VENOUS BLD VENIPUNCTURE: CPT

## 2021-02-25 PROCEDURE — 6370000000 HC RX 637 (ALT 250 FOR IP): Performed by: OBSTETRICS & GYNECOLOGY

## 2021-02-25 PROCEDURE — 2580000003 HC RX 258: Performed by: OBSTETRICS & GYNECOLOGY

## 2021-02-25 PROCEDURE — 6360000002 HC RX W HCPCS: Performed by: OBSTETRICS & GYNECOLOGY

## 2021-02-25 PROCEDURE — 1220000000 HC SEMI PRIVATE OB R&B

## 2021-02-25 PROCEDURE — 85027 COMPLETE CBC AUTOMATED: CPT

## 2021-02-25 RX ORDER — BUPRENORPHINE 2 MG/1
4 TABLET SUBLINGUAL NIGHTLY
Status: DISCONTINUED | OUTPATIENT
Start: 2021-02-25 | End: 2021-02-26 | Stop reason: HOSPADM

## 2021-02-25 RX ORDER — BUPRENORPHINE HYDROCHLORIDE 8 MG/1
8 TABLET SUBLINGUAL EVERY MORNING
Status: DISCONTINUED | OUTPATIENT
Start: 2021-02-25 | End: 2021-02-26 | Stop reason: HOSPADM

## 2021-02-25 RX ORDER — FAMOTIDINE 20 MG/1
20 TABLET, FILM COATED ORAL 2 TIMES DAILY
Status: DISCONTINUED | OUTPATIENT
Start: 2021-02-25 | End: 2021-02-26 | Stop reason: HOSPADM

## 2021-02-25 RX ADMIN — ACETAMINOPHEN 650 MG: 325 TABLET ORAL at 11:18

## 2021-02-25 RX ADMIN — DOCUSATE SODIUM 100 MG: 100 CAPSULE, LIQUID FILLED ORAL at 20:58

## 2021-02-25 RX ADMIN — ACETAMINOPHEN 650 MG: 325 TABLET ORAL at 20:58

## 2021-02-25 RX ADMIN — Medication 10 ML: at 05:57

## 2021-02-25 RX ADMIN — IBUPROFEN 600 MG: 600 TABLET ORAL at 13:27

## 2021-02-25 RX ADMIN — BUPRENORPHINE HCL 4 MG: 2 TABLET SUBLINGUAL at 20:58

## 2021-02-25 RX ADMIN — CLONAZEPAM 0.5 MG: 0.5 TABLET ORAL at 08:30

## 2021-02-25 RX ADMIN — BUPRENORPHINE HCL 8 MG: 8 TABLET SUBLINGUAL at 08:33

## 2021-02-25 RX ADMIN — ACETAMINOPHEN 650 MG: 325 TABLET ORAL at 00:55

## 2021-02-25 RX ADMIN — KETOROLAC TROMETHAMINE 30 MG: 30 INJECTION, SOLUTION INTRAMUSCULAR at 05:56

## 2021-02-25 RX ADMIN — FAMOTIDINE 20 MG: 20 TABLET ORAL at 23:07

## 2021-02-25 RX ADMIN — DOCUSATE SODIUM 100 MG: 100 CAPSULE, LIQUID FILLED ORAL at 08:30

## 2021-02-25 RX ADMIN — IBUPROFEN 600 MG: 600 TABLET ORAL at 19:37

## 2021-02-25 RX ADMIN — ACETAMINOPHEN 650 MG: 325 TABLET ORAL at 16:29

## 2021-02-25 ASSESSMENT — PAIN SCALES - GENERAL
PAINLEVEL_OUTOF10: 3
PAINLEVEL_OUTOF10: 4
PAINLEVEL_OUTOF10: 4
PAINLEVEL_OUTOF10: 6

## 2021-02-25 NOTE — PROGRESS NOTES
Department of Obstetrics and Gynecology  Labor and Delivery  Attending Post Partum Progress Note      SUBJECTIVE:  Pt without complaints, pain controlled, tolerating po, lochia wnl, currently pumping to breast feed. Infant in SCN    OBJECTIVE:      Vitals:  Vitals:    21 0811   BP: 109/66   Pulse: 72   Resp: 18   Temp: 97.7 °F (36.5 °C)       RRR CTAB  ABDOMEN:  soft, non-distended, non-tender, + BS  FF below umbilicus  EXT: no edema    DATA:    CBC:    Lab Results   Component Value Date    WBC 11.3 2021    HGB 10.2 2021    HCT 30.8 2021     2021       ASSESSMENT & PLAN:    31 y.o.   OB History    This patient's OB History needs to be verified. Open OB History to review and resolve any issues.    6    Para   3    Term   3       0    AB   3    Living   3       SAB   3    TAB   0    Ectopic   0    Molar        Multiple   1    Live Births   3             s/p  ppd# 1  1. Doing well, continue routine post-partum care.

## 2021-02-25 NOTE — ANESTHESIA POSTPROCEDURE EVALUATION
Department of Anesthesiology  Postprocedure Note    Patient: Martha Green  MRN: [id number]  YOB: 1990  Date of evaluation: 2/25/2021  Time:  7:41 AM     Procedure Summary     Date: 02/23/21 Room / Location:     Anesthesia Start: 1803 Anesthesia Stop: 02/24/21 1453    Procedure: Labor Analgesia Diagnosis:     Scheduled Providers:  Responsible Provider: Willy Cueto MD    Anesthesia Type: epidural ASA Status: 3 - Emergent          Anesthesia Type: epidural    Yuli Phase I: Yuli Score: 9    Yuli Phase II: Yuli Score: 10    Last vitals: Reviewed and per EMR flowsheets. Anesthesia Post Evaluation    Level of consciousness: awake and alert  Nausea & Vomiting: no vomiting and no nausea  Complications: no  Cardiovascular status: hemodynamically stable  Respiratory status: acceptable and room air  Hydration status: stable  Comments: Patient is s/p vaginal delivery with epidural analgesia. Progress notes, flow sheets, labs, and MARs reviewed. No apparent or reported anesthesia complications thus far.

## 2021-02-25 NOTE — L&D DELIVERY SUMMARY NOTE
[address] [address]                            LABOR AND DELIVERY NOTE    PATIENT NAME: Taras Oglesby                :        1990  MED REC NO:   [id number]                          ROOM:       3600  ACCOUNT NO:   [de-identified]                           ADMIT DATE: 2021  PROVIDER:     Mell Solorio MD    DATE OF PROCEDURE:  2021    A 53-year-old  6, para 2, who is admitted at 40 weeks 3 days for  an induction of labor secondary to postdates. The patient's history is  remarkable for a history of opioid abuse. She has been without drug use  for 2 years and is currently on Subutex in a registered program.    Her prenatal course was otherwise uncomplicated. Group beta strep  culture was negative. The patient was admitted on  for induction  of labor. The patient received Cytotec followed by Pitocin. Amniotomy  was performed for clear fluid. The patient did request and receive an  epidural during labor. The patient progressed to second stage and after a 10-minute second  stage, delivered by spontaneous vaginal delivery of a viable female  infant with Apgars 7 at one minute and 9 at five minutes. Nuchal cord  was noted at delivery and reduced. No episiotomy was performed and no  lacerations occurred with delivery. Placenta delivered spontaneously grossly intact with a three-vessel  cord. Estimated blood loss was 150 mL. Fetal weight was pending at the  time of this dictation. Baby would be observed for 96 hours for  withdrawal syndrome. The patient named her mary Frazier.         [address] MD    D: 2021 15:07:37       T: 2021 15:14:21     [email or web address]  Job#: [id number]     Doc#: [id number]    CC:
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
## 2021-02-26 VITALS
SYSTOLIC BLOOD PRESSURE: 99 MMHG | BODY MASS INDEX: 40.32 KG/M2 | RESPIRATION RATE: 16 BRPM | WEIGHT: 242 LBS | TEMPERATURE: 97.9 F | HEIGHT: 65 IN | HEART RATE: 75 BPM | DIASTOLIC BLOOD PRESSURE: 64 MMHG

## 2021-02-26 PROBLEM — O48.0 POST-DATES PREGNANCY: Status: RESOLVED | Noted: 2021-02-24 | Resolved: 2021-02-26

## 2021-02-26 PROCEDURE — 6360000002 HC RX W HCPCS: Performed by: OBSTETRICS & GYNECOLOGY

## 2021-02-26 PROCEDURE — 6370000000 HC RX 637 (ALT 250 FOR IP): Performed by: OBSTETRICS & GYNECOLOGY

## 2021-02-26 RX ORDER — IBUPROFEN 800 MG/1
800 TABLET ORAL EVERY 6 HOURS PRN
Qty: 30 TABLET | Refills: 1 | Status: SHIPPED | OUTPATIENT
Start: 2021-02-26

## 2021-02-26 RX ADMIN — FAMOTIDINE 20 MG: 20 TABLET ORAL at 08:43

## 2021-02-26 RX ADMIN — BUPRENORPHINE HCL 8 MG: 8 TABLET SUBLINGUAL at 08:44

## 2021-02-26 RX ADMIN — DOCUSATE SODIUM 100 MG: 100 CAPSULE, LIQUID FILLED ORAL at 08:44

## 2021-02-26 RX ADMIN — IBUPROFEN 600 MG: 600 TABLET ORAL at 03:41

## 2021-02-26 RX ADMIN — CLONAZEPAM 0.5 MG: 0.5 TABLET ORAL at 08:44

## 2021-02-26 RX ADMIN — IBUPROFEN 600 MG: 600 TABLET ORAL at 12:30

## 2021-02-26 RX ADMIN — ACETAMINOPHEN 650 MG: 325 TABLET ORAL at 05:13

## 2021-02-26 RX ADMIN — ACETAMINOPHEN 650 MG: 325 TABLET ORAL at 01:06

## 2021-02-26 ASSESSMENT — PAIN SCALES - GENERAL
PAINLEVEL_OUTOF10: 4
PAINLEVEL_OUTOF10: 6
PAINLEVEL_OUTOF10: 3
PAINLEVEL_OUTOF10: 3

## 2021-02-26 NOTE — PROGRESS NOTES
Patient reports symptoms began while taking a shower. Improved during the shower however returned after out of shower. Currently not as bad as before. Describes pain as burning in neck/ears/base of head. Discomfort improving while in bed. Denies blurred vision, shortness of breath or chest pain. Also asked patient to trial lying flat to see if positional.  Patient took PM does of Subutex at 9 pm.  Patient reports discomfort improved in supine position. Will continue to monitor. VSS. Pepcid if heartburn component.    /66   Pulse 88   Temp 98 °F (36.7 °C) (Oral)   Resp 16   Ht 5' 5\" (1.651 m)   Wt 242 lb (109.8 kg)   Breastfeeding Unknown   BMI 40.27 kg/m²

## 2021-02-26 NOTE — PLAN OF CARE
Problem: Anxiety:  Goal: Level of anxiety will decrease  Description: Level of anxiety will decrease  Outcome: Ongoing     Problem: Breathing Pattern - Ineffective:  Goal: Able to breathe comfortably  Description: Able to breathe comfortably  Outcome: Ongoing     Problem: Fluid Volume - Imbalance:  Goal: Absence of imbalanced fluid volume signs and symptoms  Description: Absence of imbalanced fluid volume signs and symptoms  Outcome: Ongoing  Goal: Absence of intrapartum hemorrhage signs and symptoms  Description: Absence of intrapartum hemorrhage signs and symptoms  Outcome: Ongoing  Goal: Absence of postpartum hemorrhage signs and symptoms  Description: Absence of postpartum hemorrhage signs and symptoms  Outcome: Ongoing     Problem: Infection - Intrapartum Infection:  Goal: Will show no infection signs and symptoms  Description: Will show no infection signs and symptoms  Outcome: Ongoing     Problem: Labor Process - Prolonged:  Goal: Labor progression, first stage, within specified pattern  Description: Labor progression, first stage, within specified pattern  Outcome: Ongoing  Goal: Labor progession, second stage, within specified pattern  Description: Labor progession, second stage, within specified pattern  Outcome: Ongoing  Goal: Uterine contractions within specified parameters  Description: Uterine contractions within specified parameters  Outcome: Ongoing     Problem:  Screening:  Goal: Ability to make informed decisions regarding treatment has improved  Description: Ability to make informed decisions regarding treatment has improved  Outcome: Ongoing     Problem: Pain - Acute:  Goal: Pain level will decrease  Description: Pain level will decrease  Outcome: Ongoing  Goal: Able to cope with pain  Description: Able to cope with pain  Outcome: Ongoing     Problem: Tissue Perfusion - Uteroplacental, Altered:  Goal: Absence of abnormal fetal heart rate pattern  Description: Absence of abnormal fetal

## 2021-02-26 NOTE — CARE COORDINATION
Follow up today. Noted that infant to be transferred to [facility].   Writer notified Rusty Kim CPS of infant's delivery and Mob's enrollment in [facility] Spoke with patient's daughter and discussed results     Patient is scheduled for MW visit on 12/12/19 and is supposed to have repeat labs for thyroid between 12/23/19 and 01/23/19-patient's daughter would like to know if MD wants patient to get those labs drawn a couple weeks sooner so MD has results at visit on 12/12/19-please advise

## 2021-02-26 NOTE — PROGRESS NOTES
Dr. Mathew Purdy at bedside at [phone]. See provider note. Pepcid given at 2307 to treat any heartburn as a possible underlying factor to pain. After positioning supine and additional ice pack added to base of neck, pt reports an improvement of head/neck pain to a 3-4/10 severity and denies any chest pain.

## 2021-02-26 NOTE — PROGRESS NOTES
Department of Obstetrics and Gynecology  Labor and Delivery  Attending Post Partum Progress Note      SUBJECTIVE:  No probs. Lochia normal. Pain well-controlled with Motrin/Tylenol. Infant being transferred To [facility]. Pt desires to be discharged today. OBJECTIVE:      Vitals:  BP 99/64   Pulse 75   Temp 97.9 °F (36.6 °C) (Oral)   Resp 16   Ht 5' 5\" (1.651 m)   Wt 242 lb (109.8 kg)   Breastfeeding Unknown   BMI 40.27 kg/m²     ABDOMEN:  FFNT  GENITAL/URINARY:  Deferred  EXT:  NT    DATA:    CBC:    Lab Results   Component Value Date    WBC 11.3 2021    RBC 3.97 2021    HGB 10.2 2021    HCT 30.8 2021    MCV 77.7 2021    RDW 15.1 2021     2021       ASSESSMENT & PLAN:      IMP:  PPD#2 S/P , doing well. PLAN:  Home. Instructed. Rx Motrin. F/U 6 weeks office.     Leonel Ortega MD

## 2022-10-21 NOTE — LACTATION NOTE
Lactation Progress Note      Data:   F/U on multip who is pumping for baby that has been transferred to [facility]. Mob is hoping to be d/c today. Action: Instructions provided for pumping, milk collection and storage. Instructed to take personal pump pieces from [employer] pump to [facility] and request to be set up with [employer] pump there. Stressed importance of continuing to stimulate the breast at least 8 times per day with pump until baby is able to go to breast.    Response: Verbalized understanding and comfortable with pumping at this time.
Lactation Progress Note      Data:   F/u with multip who is pumping for baby in [facility]. Pt reports doing well. Infant is NPO, but desires to breast feed when able. Continues to pump using premie+ setting every 3 hours. Action: Pumping education reviewed including set up and use of breast pump with premie+ setting, collection, storage, and preparation of expressed breast milk. Reviewed what to expect with volumes expressed explaining that colostrum is thick, small in volume, and difficult for the pump to express. Reassured that thinner mature milk will be easier to express. Explained milk production and when to expect mature milk supply. Educated on benefits of STS, hand expression and breast massage/compressions to support pumping. Encouraged pt to pump q3h x 15 minutes with premie+ setting, and a minimum of 8x in a 24 hour period. Reviewed appropriate flange fit, and how to adjust pump suction as needed, explaining that pumping should not cause discomfort or pain. Reviewed how to clean pump equipment. Offered continued lactation support, and assistance with latching when baby is able to go to the breast. Name and number provided on whiteboard. Encouraged to call for f/u lactation support prn. Response: Verbalized understanding of teaching provided. Confident with pumping and plan of care at this time. Will call for f/u prn.
Lactation Progress Note      Data:   Multip who is pumping for baby in [facility]. Baby currently NPO. Mob states that she is only collecting a small volume and is concerned. Noted several ml in the bottle om pump bottle. Action: Explained that colostrum does not pump well and the amount she has collected is very good. Reassured that with consistent pumping mature milk will come in and she will collect more. Also encouraged breast massage and manual expression to maximize amount collected. Stressed importance of pumping at least 8 times per day and encouraged to continue with the preemie setting until milk is in. Reassured that lactation would be available to assist with at breast feeding when baby is able. [facility] number on board for f/u. Response: Verbalized understanding. Comfortable with pumping at this time.
I have reviewed and confirmed nurses' notes for patient's medications, allergies, medical history, and surgical history.

## 2023-03-23 ENCOUNTER — ANCILLARY PROCEDURE (OUTPATIENT)
Dept: EMERGENCY DEPT | Age: 33
DRG: 383 | End: 2023-03-23
Payer: COMMERCIAL

## 2023-03-23 ENCOUNTER — HOSPITAL ENCOUNTER (INPATIENT)
Age: 33
LOS: 3 days | Discharge: HOME | DRG: 383 | End: 2023-03-26
Attending: EMERGENCY MEDICINE | Admitting: INTERNAL MEDICINE
Payer: COMMERCIAL

## 2023-03-23 ENCOUNTER — APPOINTMENT (OUTPATIENT)
Dept: GENERAL RADIOLOGY | Age: 33
DRG: 383 | End: 2023-03-23
Payer: COMMERCIAL

## 2023-03-23 DIAGNOSIS — M25.461 KNEE EFFUSION, RIGHT: ICD-10-CM

## 2023-03-23 DIAGNOSIS — L03.115 CELLULITIS OF RIGHT KNEE: Primary | ICD-10-CM

## 2023-03-23 PROBLEM — L03.90 CELLULITIS: Status: ACTIVE | Noted: 2023-03-23

## 2023-03-23 LAB
ALBUMIN SERPL-MCNC: 4.3 G/DL (ref 3.4–5)
ALBUMIN/GLOB SERPL: 1.5 {RATIO} (ref 1.1–2.2)
ALP SERPL-CCNC: 73 U/L (ref 40–129)
ALT SERPL-CCNC: 19 U/L (ref 10–40)
ANION GAP SERPL CALCULATED.3IONS-SCNC: 13 MMOL/L (ref 3–16)
APPEARANCE FLUID: CLEAR
AST SERPL-CCNC: 17 U/L (ref 15–37)
BASOPHILS # BLD: 0 K/UL (ref 0–0.2)
BASOPHILS NFR BLD: 0.4 %
BDY FLUID QUALITY: NORMAL
BILIRUB SERPL-MCNC: <0.2 MG/DL (ref 0–1)
BUN SERPL-MCNC: 13 MG/DL (ref 7–20)
CALCIUM SERPL-MCNC: 9.7 MG/DL (ref 8.3–10.6)
CELL COUNT FLUID TYPE: NORMAL
CHLORIDE SERPL-SCNC: 103 MMOL/L (ref 99–110)
CO2 SERPL-SCNC: 23 MMOL/L (ref 21–32)
COLOR FLUID: NORMAL
CREAT SERPL-MCNC: 0.7 MG/DL (ref 0.6–1.1)
CRP SERPL-MCNC: 30.7 MG/L (ref 0–5.1)
CRYSTALS FLD MICRO: NORMAL
DEPRECATED RDW RBC AUTO: 14 % (ref 12.4–15.4)
DIFF PNL FLD: NORMAL
EOSINOPHIL # BLD: 0.4 K/UL (ref 0–0.6)
EOSINOPHIL NFR BLD: 5 %
FLUAV RNA RESP QL NAA+PROBE: NOT DETECTED
FLUBV RNA RESP QL NAA+PROBE: NOT DETECTED
GFR SERPLBLD CREATININE-BSD FMLA CKD-EPI: >60 ML/MIN/{1.73_M2}
GLUCOSE SERPL-MCNC: 100 MG/DL (ref 70–99)
HCG SERPL QL: NEGATIVE
HCT VFR BLD AUTO: 36.7 % (ref 36–48)
HGB BLD-MCNC: 12.4 G/DL (ref 12–16)
LACTATE BLDV-SCNC: 0.7 MMOL/L (ref 0.4–1.9)
LYMPHOCYTES # BLD: 2.3 K/UL (ref 1–5.1)
LYMPHOCYTES NFR BLD: 29.9 %
MCH RBC QN AUTO: 27.8 PG (ref 26–34)
MCHC RBC AUTO-ENTMCNC: 33.7 G/DL (ref 31–36)
MCV RBC AUTO: 82.3 FL (ref 80–100)
MONOCYTES # BLD: 0.5 K/UL (ref 0–1.3)
MONOCYTES NFR BLD: 6.3 %
NEUTROPHILS # BLD: 4.5 K/UL (ref 1.7–7.7)
NEUTROPHILS NFR BLD: 58.4 %
NUC CELL # FLD: 0 /CUMM
PLATELET # BLD AUTO: 403 K/UL (ref 135–450)
PMV BLD AUTO: 7.3 FL (ref 5–10.5)
POTASSIUM SERPL-SCNC: 3.8 MMOL/L (ref 3.5–5.1)
PROT SERPL-MCNC: 7.2 G/DL (ref 6.4–8.2)
RBC # BLD AUTO: 4.46 M/UL (ref 4–5.2)
RBC FLUID: 0 /CUMM
REASON FOR REJECTION: NORMAL
REJECTED TEST: NORMAL
SARS-COV-2 RNA RESP QL NAA+PROBE: NOT DETECTED
SODIUM SERPL-SCNC: 139 MMOL/L (ref 136–145)
SPECIMEN SOURCE FLD: NORMAL
WBC # BLD AUTO: 7.6 K/UL (ref 4–11)

## 2023-03-23 PROCEDURE — 99285 EMERGENCY DEPT VISIT HI MDM: CPT

## 2023-03-23 PROCEDURE — 89060 EXAM SYNOVIAL FLUID CRYSTALS: CPT

## 2023-03-23 PROCEDURE — 89050 BODY FLUID CELL COUNT: CPT

## 2023-03-23 PROCEDURE — 2580000003 HC RX 258: Performed by: NURSE PRACTITIONER

## 2023-03-23 PROCEDURE — 85025 COMPLETE CBC W/AUTO DIFF WBC: CPT

## 2023-03-23 PROCEDURE — 6370000000 HC RX 637 (ALT 250 FOR IP): Performed by: NURSE PRACTITIONER

## 2023-03-23 PROCEDURE — 36415 COLL VENOUS BLD VENIPUNCTURE: CPT

## 2023-03-23 PROCEDURE — 6360000002 HC RX W HCPCS: Performed by: NURSE PRACTITIONER

## 2023-03-23 PROCEDURE — 0S9C3ZZ DRAINAGE OF RIGHT KNEE JOINT, PERCUTANEOUS APPROACH: ICD-10-PCS | Performed by: INTERNAL MEDICINE

## 2023-03-23 PROCEDURE — 87040 BLOOD CULTURE FOR BACTERIA: CPT

## 2023-03-23 PROCEDURE — 80053 COMPREHEN METABOLIC PANEL: CPT

## 2023-03-23 PROCEDURE — 87070 CULTURE OTHR SPECIMN AEROBIC: CPT

## 2023-03-23 PROCEDURE — 76882 US LMTD JT/FCL EVL NVASC XTR: CPT

## 2023-03-23 PROCEDURE — 84703 CHORIONIC GONADOTROPIN ASSAY: CPT

## 2023-03-23 PROCEDURE — 73562 X-RAY EXAM OF KNEE 3: CPT

## 2023-03-23 PROCEDURE — 87636 SARSCOV2 & INF A&B AMP PRB: CPT

## 2023-03-23 PROCEDURE — 87205 SMEAR GRAM STAIN: CPT

## 2023-03-23 PROCEDURE — 96365 THER/PROPH/DIAG IV INF INIT: CPT

## 2023-03-23 PROCEDURE — 1200000000 HC SEMI PRIVATE

## 2023-03-23 PROCEDURE — 86140 C-REACTIVE PROTEIN: CPT

## 2023-03-23 PROCEDURE — 20610 DRAIN/INJ JOINT/BURSA W/O US: CPT

## 2023-03-23 PROCEDURE — 83605 ASSAY OF LACTIC ACID: CPT

## 2023-03-23 RX ORDER — ESCITALOPRAM OXALATE 10 MG/1
20 TABLET ORAL DAILY
COMMUNITY

## 2023-03-23 RX ORDER — ARIPIPRAZOLE 10 MG/1
10 TABLET ORAL DAILY
COMMUNITY

## 2023-03-23 RX ORDER — HYDROCODONE BITARTRATE AND ACETAMINOPHEN 5; 325 MG/1; MG/1
1 TABLET ORAL ONCE
Status: COMPLETED | OUTPATIENT
Start: 2023-03-23 | End: 2023-03-23

## 2023-03-23 RX ADMIN — VANCOMYCIN HYDROCHLORIDE 1250 MG: 10 INJECTION, POWDER, LYOPHILIZED, FOR SOLUTION INTRAVENOUS at 21:31

## 2023-03-23 RX ADMIN — HYDROCODONE BITARTRATE AND ACETAMINOPHEN 1 TABLET: 5; 325 TABLET ORAL at 22:21

## 2023-03-23 ASSESSMENT — PAIN DESCRIPTION - LOCATION
LOCATION: KNEE

## 2023-03-23 ASSESSMENT — PAIN DESCRIPTION - ORIENTATION
ORIENTATION: RIGHT

## 2023-03-23 ASSESSMENT — LIFESTYLE VARIABLES
HOW MANY STANDARD DRINKS CONTAINING ALCOHOL DO YOU HAVE ON A TYPICAL DAY: 1 OR 2
HOW OFTEN DO YOU HAVE A DRINK CONTAINING ALCOHOL: MONTHLY OR LESS

## 2023-03-23 ASSESSMENT — PAIN SCALES - GENERAL
PAINLEVEL_OUTOF10: 5
PAINLEVEL_OUTOF10: 6
PAINLEVEL_OUTOF10: 5
PAINLEVEL_OUTOF10: 7

## 2023-03-23 ASSESSMENT — PAIN - FUNCTIONAL ASSESSMENT: PAIN_FUNCTIONAL_ASSESSMENT: 0-10

## 2023-03-23 ASSESSMENT — PAIN DESCRIPTION - PAIN TYPE: TYPE: ACUTE PAIN

## 2023-03-23 NOTE — ED NOTES
PS was sent to Orthopedic Surg for a consult to [facility].     Ginny Durham First returned page to [facility] APRN-CNP     [address]  03/23/23 1955

## 2023-03-23 NOTE — ED PROVIDER NOTES
I independently performed a history and physical on [facility]. All diagnostic, treatment, and disposition decisions were made by myself in conjunction with the advanced practice provider/resident. For further details of [facility] emergency department encounter, please see the MARIA/resident's documentation. I personally saw the patient and performed a substantive portion of the visit including all aspects of the medical decision making. Briefly, this is a 46-year-old female presenting for evaluation of right knee pain. Over the past several days, she has developed progressive redness overlying the right knee. She has had difficulty walking and pain in the right knee. She has had low-grade fever. She was seen at an urgent care started on antibiotics however this has not improved her symptoms. She on exam has erythema over the anterior aspect of the right knee as well as associated right knee swelling, pain with passive range of motion. She is afebrile here. Bedside ultrasound reveals joint fluid collection that is concerning in the setting of her symptoms concerning for septic joint. Because the overlying erythema, we will not be able to perform bedside arthrocentesis. We have recommended starting IV antibiotics and admission for orthopedic evaluation. Per orthopedic surgery recommendations, they have requested that we perform arthrocentesis despite concerns for overlying cellulitic change.     POC US EXTREMITY NON VASC LIMITED    Result Date: 3/23/2023  [id number]     Exam Information:          Exam type:  Clinically indicated     Indication(s) for Exam:          The exam was performed with the following indications[de-identified]  Soft tissue swelling, Soft tissue redness, Decreased Range of Motion     Findings:          Joint effusion[de-identified]  Present     Interpretation:          Joint effusion (see above)  Electronically signed by Moustapha Bradford on Thursday, March 23, 2023 at 7:59 PM

## 2023-03-24 PROBLEM — L03.115 CELLULITIS OF RIGHT KNEE: Status: ACTIVE | Noted: 2023-03-24

## 2023-03-24 LAB
ANION GAP SERPL CALCULATED.3IONS-SCNC: 8 MMOL/L (ref 3–16)
BASOPHILS # BLD: 0.1 K/UL (ref 0–0.2)
BASOPHILS NFR BLD: 1.6 %
BUN SERPL-MCNC: 14 MG/DL (ref 7–20)
CALCIUM SERPL-MCNC: 9.2 MG/DL (ref 8.3–10.6)
CHLORIDE SERPL-SCNC: 106 MMOL/L (ref 99–110)
CO2 SERPL-SCNC: 27 MMOL/L (ref 21–32)
CREAT SERPL-MCNC: 0.7 MG/DL (ref 0.6–1.1)
DEPRECATED RDW RBC AUTO: 14.1 % (ref 12.4–15.4)
EOSINOPHIL # BLD: 0.5 K/UL (ref 0–0.6)
EOSINOPHIL NFR BLD: 8.3 %
ERYTHROCYTE [SEDIMENTATION RATE] IN BLOOD BY WESTERGREN METHOD: 37 MM/HR (ref 0–20)
GFR SERPLBLD CREATININE-BSD FMLA CKD-EPI: >60 ML/MIN/{1.73_M2}
GLUCOSE SERPL-MCNC: 95 MG/DL (ref 70–99)
HCT VFR BLD AUTO: 33.9 % (ref 36–48)
HGB BLD-MCNC: 11.3 G/DL (ref 12–16)
LYMPHOCYTES # BLD: 2.7 K/UL (ref 1–5.1)
LYMPHOCYTES NFR BLD: 40.6 %
MCH RBC QN AUTO: 28 PG (ref 26–34)
MCHC RBC AUTO-ENTMCNC: 33.4 G/DL (ref 31–36)
MCV RBC AUTO: 83.8 FL (ref 80–100)
MONOCYTES # BLD: 0.5 K/UL (ref 0–1.3)
MONOCYTES NFR BLD: 8 %
NEUTROPHILS # BLD: 2.7 K/UL (ref 1.7–7.7)
NEUTROPHILS NFR BLD: 41.5 %
PLATELET # BLD AUTO: 343 K/UL (ref 135–450)
PMV BLD AUTO: 7.1 FL (ref 5–10.5)
POTASSIUM SERPL-SCNC: 3.7 MMOL/L (ref 3.5–5.1)
RBC # BLD AUTO: 4.05 M/UL (ref 4–5.2)
SODIUM SERPL-SCNC: 141 MMOL/L (ref 136–145)
VANCOMYCIN TROUGH SERPL-MCNC: 12.8 UG/ML (ref 10–20)
WBC # BLD AUTO: 6.6 K/UL (ref 4–11)

## 2023-03-24 PROCEDURE — 99232 SBSQ HOSP IP/OBS MODERATE 35: CPT

## 2023-03-24 PROCEDURE — 36415 COLL VENOUS BLD VENIPUNCTURE: CPT

## 2023-03-24 PROCEDURE — 6370000000 HC RX 637 (ALT 250 FOR IP): Performed by: INTERNAL MEDICINE

## 2023-03-24 PROCEDURE — 6370000000 HC RX 637 (ALT 250 FOR IP)

## 2023-03-24 PROCEDURE — 2580000003 HC RX 258

## 2023-03-24 PROCEDURE — 99221 1ST HOSP IP/OBS SF/LOW 40: CPT | Performed by: PHYSICIAN ASSISTANT

## 2023-03-24 PROCEDURE — 1200000000 HC SEMI PRIVATE

## 2023-03-24 PROCEDURE — 6360000002 HC RX W HCPCS: Performed by: INTERNAL MEDICINE

## 2023-03-24 PROCEDURE — 85025 COMPLETE CBC W/AUTO DIFF WBC: CPT

## 2023-03-24 PROCEDURE — 85652 RBC SED RATE AUTOMATED: CPT

## 2023-03-24 PROCEDURE — 80048 BASIC METABOLIC PNL TOTAL CA: CPT

## 2023-03-24 PROCEDURE — 2580000003 HC RX 258: Performed by: INTERNAL MEDICINE

## 2023-03-24 PROCEDURE — 80202 ASSAY OF VANCOMYCIN: CPT

## 2023-03-24 RX ORDER — SODIUM CHLORIDE 9 MG/ML
INJECTION, SOLUTION INTRAVENOUS CONTINUOUS
Status: ACTIVE | OUTPATIENT
Start: 2023-03-24 | End: 2023-03-24

## 2023-03-24 RX ORDER — ENOXAPARIN SODIUM 100 MG/ML
40 INJECTION SUBCUTANEOUS DAILY
Status: DISCONTINUED | OUTPATIENT
Start: 2023-03-24 | End: 2023-03-24

## 2023-03-24 RX ORDER — POLYETHYLENE GLYCOL 3350 17 G/17G
17 POWDER, FOR SOLUTION ORAL DAILY PRN
Status: DISCONTINUED | OUTPATIENT
Start: 2023-03-24 | End: 2023-03-26 | Stop reason: HOSPADM

## 2023-03-24 RX ORDER — ONDANSETRON 2 MG/ML
4 INJECTION INTRAMUSCULAR; INTRAVENOUS EVERY 6 HOURS PRN
Status: DISCONTINUED | OUTPATIENT
Start: 2023-03-24 | End: 2023-03-26 | Stop reason: HOSPADM

## 2023-03-24 RX ORDER — SODIUM CHLORIDE 9 MG/ML
INJECTION, SOLUTION INTRAVENOUS CONTINUOUS
Status: DISCONTINUED | OUTPATIENT
Start: 2023-03-24 | End: 2023-03-24

## 2023-03-24 RX ORDER — ACETAMINOPHEN 650 MG/1
650 SUPPOSITORY RECTAL EVERY 6 HOURS PRN
Status: DISCONTINUED | OUTPATIENT
Start: 2023-03-24 | End: 2023-03-26 | Stop reason: HOSPADM

## 2023-03-24 RX ORDER — GABAPENTIN 400 MG/1
800 CAPSULE ORAL 3 TIMES DAILY
Status: DISCONTINUED | OUTPATIENT
Start: 2023-03-24 | End: 2023-03-26 | Stop reason: HOSPADM

## 2023-03-24 RX ORDER — ARIPIPRAZOLE 10 MG/1
10 TABLET ORAL DAILY
Status: DISCONTINUED | OUTPATIENT
Start: 2023-03-24 | End: 2023-03-26 | Stop reason: HOSPADM

## 2023-03-24 RX ORDER — ACETAMINOPHEN 325 MG/1
650 TABLET ORAL EVERY 6 HOURS PRN
Status: DISCONTINUED | OUTPATIENT
Start: 2023-03-24 | End: 2023-03-26 | Stop reason: HOSPADM

## 2023-03-24 RX ORDER — ENOXAPARIN SODIUM 100 MG/ML
30 INJECTION SUBCUTANEOUS 2 TIMES DAILY
Status: DISCONTINUED | OUTPATIENT
Start: 2023-03-24 | End: 2023-03-26 | Stop reason: HOSPADM

## 2023-03-24 RX ORDER — CLONAZEPAM 0.5 MG/1
0.5 TABLET ORAL 3 TIMES DAILY
Status: DISCONTINUED | OUTPATIENT
Start: 2023-03-24 | End: 2023-03-26 | Stop reason: HOSPADM

## 2023-03-24 RX ORDER — ESCITALOPRAM OXALATE 10 MG/1
20 TABLET ORAL DAILY
Status: DISCONTINUED | OUTPATIENT
Start: 2023-03-24 | End: 2023-03-26 | Stop reason: HOSPADM

## 2023-03-24 RX ORDER — SODIUM CHLORIDE 0.9 % (FLUSH) 0.9 %
5-40 SYRINGE (ML) INJECTION PRN
Status: DISCONTINUED | OUTPATIENT
Start: 2023-03-24 | End: 2023-03-26 | Stop reason: HOSPADM

## 2023-03-24 RX ORDER — HYDROCODONE BITARTRATE AND ACETAMINOPHEN 7.5; 325 MG/1; MG/1
1 TABLET ORAL EVERY 6 HOURS PRN
Status: DISCONTINUED | OUTPATIENT
Start: 2023-03-24 | End: 2023-03-26 | Stop reason: HOSPADM

## 2023-03-24 RX ORDER — SODIUM CHLORIDE 0.9 % (FLUSH) 0.9 %
5-40 SYRINGE (ML) INJECTION EVERY 12 HOURS SCHEDULED
Status: DISCONTINUED | OUTPATIENT
Start: 2023-03-24 | End: 2023-03-26 | Stop reason: HOSPADM

## 2023-03-24 RX ORDER — SODIUM CHLORIDE 9 MG/ML
INJECTION, SOLUTION INTRAVENOUS PRN
Status: DISCONTINUED | OUTPATIENT
Start: 2023-03-24 | End: 2023-03-26 | Stop reason: HOSPADM

## 2023-03-24 RX ORDER — ONDANSETRON 4 MG/1
4 TABLET, ORALLY DISINTEGRATING ORAL EVERY 8 HOURS PRN
Status: DISCONTINUED | OUTPATIENT
Start: 2023-03-24 | End: 2023-03-26 | Stop reason: HOSPADM

## 2023-03-24 RX ADMIN — ENOXAPARIN SODIUM 40 MG: 100 INJECTION SUBCUTANEOUS at 08:47

## 2023-03-24 RX ADMIN — GABAPENTIN 800 MG: 400 CAPSULE ORAL at 20:33

## 2023-03-24 RX ADMIN — Medication 10 ML: at 08:45

## 2023-03-24 RX ADMIN — ESCITALOPRAM OXALATE 20 MG: 10 TABLET ORAL at 08:45

## 2023-03-24 RX ADMIN — HYDROCODONE BITARTRATE AND ACETAMINOPHEN 1 TABLET: 7.5; 325 TABLET ORAL at 11:00

## 2023-03-24 RX ADMIN — ARIPIPRAZOLE 10 MG: 10 TABLET ORAL at 08:45

## 2023-03-24 RX ADMIN — SODIUM CHLORIDE: 9 INJECTION, SOLUTION INTRAVENOUS at 01:48

## 2023-03-24 RX ADMIN — VANCOMYCIN HYDROCHLORIDE 1000 MG: 1 INJECTION, POWDER, LYOPHILIZED, FOR SOLUTION INTRAVENOUS at 12:53

## 2023-03-24 RX ADMIN — CLONAZEPAM 0.5 MG: 0.5 TABLET ORAL at 14:56

## 2023-03-24 RX ADMIN — SODIUM CHLORIDE: 0.9 INJECTION, SOLUTION INTRAVENOUS at 10:51

## 2023-03-24 RX ADMIN — ENOXAPARIN SODIUM 30 MG: 100 INJECTION SUBCUTANEOUS at 20:33

## 2023-03-24 RX ADMIN — CLONAZEPAM 0.5 MG: 0.5 TABLET ORAL at 10:52

## 2023-03-24 RX ADMIN — VANCOMYCIN HYDROCHLORIDE 1000 MG: 1 INJECTION, POWDER, LYOPHILIZED, FOR SOLUTION INTRAVENOUS at 20:36

## 2023-03-24 RX ADMIN — GABAPENTIN 800 MG: 400 CAPSULE ORAL at 14:56

## 2023-03-24 RX ADMIN — VANCOMYCIN HYDROCHLORIDE 1000 MG: 1 INJECTION, POWDER, LYOPHILIZED, FOR SOLUTION INTRAVENOUS at 05:15

## 2023-03-24 ASSESSMENT — LIFESTYLE VARIABLES
HOW MANY STANDARD DRINKS CONTAINING ALCOHOL DO YOU HAVE ON A TYPICAL DAY: 5 OR 6
HOW OFTEN DO YOU HAVE A DRINK CONTAINING ALCOHOL: MONTHLY OR LESS

## 2023-03-24 ASSESSMENT — PAIN DESCRIPTION - LOCATION: LOCATION: KNEE

## 2023-03-24 ASSESSMENT — ENCOUNTER SYMPTOMS
RHINORRHEA: 0
ABDOMINAL PAIN: 0
NAUSEA: 0
SORE THROAT: 0
BACK PAIN: 0
EYE PAIN: 0
VOMITING: 0
SHORTNESS OF BREATH: 0
COUGH: 0

## 2023-03-24 ASSESSMENT — PAIN DESCRIPTION - DESCRIPTORS: DESCRIPTORS: SHARP

## 2023-03-24 ASSESSMENT — PAIN DESCRIPTION - ORIENTATION: ORIENTATION: RIGHT

## 2023-03-24 ASSESSMENT — PAIN SCALES - GENERAL
PAINLEVEL_OUTOF10: 6
PAINLEVEL_OUTOF10: 7

## 2023-03-24 NOTE — ED PROVIDER NOTES
[address] ED  EMERGENCY DEPARTMENT ENCOUNTER        Pt Name: Terra Wayne [facility]  MRN: [id number]  [birth year]ukhigfjune 1990  Date of evaluation: 3/23/2023  Provider: DELMIS Campa - CAMILLE  PCP: Errol Pacheco MD  Note Started: 10:53 PM EDT 3/23/23       I have seen and evaluated this patient with my supervising physician Domitila Fitch MD.      [facility]       Chief Complaint   Patient presents with    Knee Pain     Patient reports \"boil\" on right knee that busted about a week ago. Was seen at urgent care 2 days ago and started on ABTs, not getting better and experiencing pain, redness and swelling. HISTORY OF PRESENT ILLNESS: 1 or more Elements     History From: Patient    Limitations to history : None    Social Determinants Significantly Affecting Health : None    Chief Complaint: Right knee pain and swelling    Alec Raza is a 29 y.o. female who presents to the emergency department with symptoms of right knee pain and swelling. Patient reported an area of swelling which was concern for possible \"boil\". Patient states that she was placed on antibiotics at a local urgent care and did not have any improvement. Patient states that symptoms been ongoing for about 1 week. States that she was placed on doxycycline without any improvement for few days after symptoms began. Reported the redness and swelling is worsened. Patient has pain within the knee with both range of motion of the knee and just any general movement causes discomfort. No symptoms of hip pain. No swelling involving the calf. Extensive redness noted to the knee. Patient states that she feels as though her knee is also quite swollen and on physical examination she reports that is swollen comparatively to the left knee. Nursing Notes were all reviewed and agreed with or any disagreements were addressed in the HPI.     REVIEW OF SYSTEMS :      Review of Systems   Constitutional:  Positive for

## 2023-03-24 NOTE — CONSULTS
Department of Orthopedic Surgery  Physician Assistant   Consult Note        Reason for Consult:  right leg pain  Requesting Physician: Thi Padilla MD  Date of Service: 3/24/2023 1:23 PM    CHIEF COMPLAINT:  As Above    History Obtained From:  patient    HISTORY OF PRESENT ILLNESS:                The patient is a 29 y.o. female who presents with above chief complaint. Pt states that for the last few days she has noticed that her right leg has gotten more red and swollen around the thigh and knee. Today she states she has some pain in the distal thigh area. Also some pain in the proximal tibia region. States the redness is a little better today after ABX last night. She did have her knee aspirated last night by the ER and cx has been negative and cell count did not show any nuc cells. Had a small infected hair follicle anterior knee a week or so ago that has not been draining or anything at this time. Past Medical History:        Diagnosis Date    Abnormal Pap smear of cervix     Autoimmune disorder ([facility] [address]     fibromyagia    Depression     Drug abuse, opioid type ([facility] [address]     2 years clean    Fibromyalgia     Former smoker     Prolonged rupture of membranes     >28 hours    Shoulder dystocia 12/31/2015    Two vessel cord     MFM following      Past Surgical History:        Procedure Laterality Date    CHOLECYSTECTOMY Bilateral 2012    TONSILLECTOMY      UPPER GASTROINTESTINAL ENDOSCOPY Bilateral 11/27/13    normal    UVULECTOMY      WISDOM TOOTH EXTRACTION           Medications Prior to Admission:   Prior to Admission medications    Medication Sig Start Date End Date Taking?  Authorizing Provider   ARIPiprazole (ABILIFY) 10 MG tablet Take 10 mg by mouth daily   Yes Historical Provider, MD   escitalopram (LEXAPRO) 10 MG tablet Take 20 mg by mouth daily   Yes Historical Provider, MD   ibuprofen (ADVIL;MOTRIN) 800 MG tablet Take 1 tablet by mouth every 6 hours as needed (Pain level 1 - 10)  Patient not taking:

## 2023-03-24 NOTE — H&P
Hospital Medicine History & Physical      PCP: Joce Lovelace MD    Date of Admission: 3/23/2023    Date of Service: Pt seen/examined on     Chief Complaint: Right knee pain and redness    History Of Present Illness:    29 y.o. female who presented to the hospital with a chief complaint of right knee pain and swelling. Patient states that she developed a boil over her right knee that popped. Soon after that she developed swelling and redness over the right knee. She went to an urgent care where she was prescribed doxycycline, she took this for 2 days but noticed that the redness and swelling had increased. She came to the emergency department to get evaluated. She endorses pain with knee flexion. She also endorses subjective fevers and chills. She denies any IV drug use or any trauma to that area except for the boil which she had on the surface of the knee. Emergency department arthrocentesis was performed and cultures were sent to the lab. She will be admitted for IV antibiotic therapy and orthopedic evaluation. Past Medical History:        Diagnosis Date    Abnormal Pap smear of cervix     Autoimmune disorder ([facility] [address]     fibromyagia    Depression     Drug abuse, opioid type ([facility] [address]     2 years clean    Fibromyalgia     Former smoker     Prolonged rupture of membranes     >28 hours    Shoulder dystocia 12/31/2015    Two vessel cord     MFM following        Past Surgical History:        Procedure Laterality Date    CHOLECYSTECTOMY Bilateral 2012    TONSILLECTOMY      UPPER GASTROINTESTINAL ENDOSCOPY Bilateral 11/27/13    normal    UVULECTOMY      WISDOM TOOTH EXTRACTION         Medications Prior to Admission:     Prior to Admission medications    Medication Sig Start Date End Date Taking?  Authorizing Provider   ARIPiprazole (ABILIFY) 10 MG tablet Take 10 mg by mouth daily   Yes Historical Provider, MD   escitalopram (LEXAPRO) 10 MG tablet Take 20 mg by mouth daily   Yes Historical Provider,

## 2023-03-24 NOTE — PLAN OF CARE
Problem: Discharge Planning  Goal: Discharge to home or other facility with appropriate resources  3/24/2023 0940 by Janett Shell RN  Outcome: Progressing  3/24/2023 0235 by Marissa Harper RN  Outcome: Progressing     Problem: Pain  Goal: Verbalizes/displays adequate comfort level or baseline comfort level  3/24/2023 0940 by Janett Shell RN  Outcome: Progressing  3/24/2023 0235 by Marissa Harper RN  Outcome: Progressing

## 2023-03-24 NOTE — FLOWSHEET NOTE
03/24/23 0842   Vital Signs   Temp 98 °F (36.7 °C)   Temp Source Oral   Heart Rate 68   Heart Rate Source Monitor   Resp 16   BP (!) 93/59   MAP (Calculated) 70   BP Location Right upper arm   BP Method Automatic   Level of Consciousness 0   MEWS Score 2   Oxygen Therapy   SpO2 96 %   O2 Device None (Room air)     Pt assessment completed, vss, see flow sheet. Pt alert and oriented x 4. Pt NPO and denies any needs at this time. Evon Frederick RN

## 2023-03-24 NOTE — CONSULTS
Pharmacy Note  Vancomycin Consult    [facility] is a 29 y.o. female started on Vancomycin for SSTI; consult received from Dr. Dillard Dubin to manage therapy. Also receiving the following antibiotics: None. Allergies:  Latex, Keflex [cephalexin], Sulfasalazine, Sulfa antibiotics, and Sulfacetamide sodium     Tmax:     Recent Labs     03/23/23 1946   CREATININE 0.7       Recent Labs     03/23/23 1946   WBC 7.6       Estimated Creatinine Clearance: 139 mL/min (based on SCr of 0.7 mg/dL). No intake or output data in the 24 hours ending 03/24/23 0306    Wt Readings from Last 1 Encounters:   03/24/23 224 lb 11.2 oz (101.9 kg)         Body mass index is 37.39 kg/m². Culture Date      Source                       Results      Loading dose (critically ill or in ICU, require dialysis or renal replacement therapy): Vancomycin 25 mg/kg IVPB x 1 (maximum 2500 mg). Maintenance dose: 15 mg/kg (maximum: 2000 mg/dose and 4500 mg/day) starting at the next dosing interval determined by renal function  Goal Vancomycin trough: 10 - 20 mcg/mL   Goal Vancomycin AUC: 400-600     Assessment/Plan:  Will initiate Vancomycin with a one time loading dose of 1250 mg x1, followed by 1000 mg IV every 8 hours. Calculated . Vancomycin level ordered for 3/24 at 2000. Timing of trough level will be determined based on culture results, renal function, and clinical response. Thank you for the consult.   Kenton Stout, PharmD  3/24/2023 3:07 AM

## 2023-03-25 LAB
ANION GAP SERPL CALCULATED.3IONS-SCNC: 9 MMOL/L (ref 3–16)
BASOPHILS # BLD: 0.1 K/UL (ref 0–0.2)
BASOPHILS NFR BLD: 2.2 %
BUN SERPL-MCNC: 12 MG/DL (ref 7–20)
CALCIUM SERPL-MCNC: 8.6 MG/DL (ref 8.3–10.6)
CHLORIDE SERPL-SCNC: 106 MMOL/L (ref 99–110)
CO2 SERPL-SCNC: 25 MMOL/L (ref 21–32)
CREAT SERPL-MCNC: 0.6 MG/DL (ref 0.6–1.1)
DEPRECATED RDW RBC AUTO: 13.9 % (ref 12.4–15.4)
EOSINOPHIL # BLD: 0.6 K/UL (ref 0–0.6)
EOSINOPHIL NFR BLD: 8.7 %
GFR SERPLBLD CREATININE-BSD FMLA CKD-EPI: >60 ML/MIN/{1.73_M2}
GLUCOSE SERPL-MCNC: 96 MG/DL (ref 70–99)
HCT VFR BLD AUTO: 32 % (ref 36–48)
HGB BLD-MCNC: 10.8 G/DL (ref 12–16)
LYMPHOCYTES # BLD: 2.7 K/UL (ref 1–5.1)
LYMPHOCYTES NFR BLD: 42.2 %
MCH RBC QN AUTO: 28.1 PG (ref 26–34)
MCHC RBC AUTO-ENTMCNC: 33.7 G/DL (ref 31–36)
MCV RBC AUTO: 83.2 FL (ref 80–100)
MONOCYTES # BLD: 0.4 K/UL (ref 0–1.3)
MONOCYTES NFR BLD: 6.7 %
NEUTROPHILS # BLD: 2.6 K/UL (ref 1.7–7.7)
NEUTROPHILS NFR BLD: 40.2 %
PLATELET # BLD AUTO: 335 K/UL (ref 135–450)
PMV BLD AUTO: 7.1 FL (ref 5–10.5)
POTASSIUM SERPL-SCNC: 3.8 MMOL/L (ref 3.5–5.1)
RBC # BLD AUTO: 3.84 M/UL (ref 4–5.2)
SODIUM SERPL-SCNC: 140 MMOL/L (ref 136–145)
WBC # BLD AUTO: 6.5 K/UL (ref 4–11)

## 2023-03-25 PROCEDURE — 2580000003 HC RX 258: Performed by: INTERNAL MEDICINE

## 2023-03-25 PROCEDURE — 80048 BASIC METABOLIC PNL TOTAL CA: CPT

## 2023-03-25 PROCEDURE — 85025 COMPLETE CBC W/AUTO DIFF WBC: CPT

## 2023-03-25 PROCEDURE — 36415 COLL VENOUS BLD VENIPUNCTURE: CPT

## 2023-03-25 PROCEDURE — 6370000000 HC RX 637 (ALT 250 FOR IP)

## 2023-03-25 PROCEDURE — 6360000002 HC RX W HCPCS: Performed by: INTERNAL MEDICINE

## 2023-03-25 PROCEDURE — 99231 SBSQ HOSP IP/OBS SF/LOW 25: CPT | Performed by: PHYSICIAN ASSISTANT

## 2023-03-25 PROCEDURE — 1200000000 HC SEMI PRIVATE

## 2023-03-25 PROCEDURE — 99232 SBSQ HOSP IP/OBS MODERATE 35: CPT | Performed by: INTERNAL MEDICINE

## 2023-03-25 PROCEDURE — 6370000000 HC RX 637 (ALT 250 FOR IP): Performed by: INTERNAL MEDICINE

## 2023-03-25 RX ADMIN — VANCOMYCIN HYDROCHLORIDE 1000 MG: 1 INJECTION, POWDER, LYOPHILIZED, FOR SOLUTION INTRAVENOUS at 05:23

## 2023-03-25 RX ADMIN — ESCITALOPRAM OXALATE 20 MG: 10 TABLET ORAL at 08:20

## 2023-03-25 RX ADMIN — GABAPENTIN 800 MG: 400 CAPSULE ORAL at 08:20

## 2023-03-25 RX ADMIN — SODIUM CHLORIDE: 9 INJECTION, SOLUTION INTRAVENOUS at 12:44

## 2023-03-25 RX ADMIN — Medication 10 ML: at 12:41

## 2023-03-25 RX ADMIN — Medication 10 ML: at 21:14

## 2023-03-25 RX ADMIN — ARIPIPRAZOLE 10 MG: 10 TABLET ORAL at 08:20

## 2023-03-25 RX ADMIN — HYDROCODONE BITARTRATE AND ACETAMINOPHEN 1 TABLET: 7.5; 325 TABLET ORAL at 08:20

## 2023-03-25 RX ADMIN — CLONAZEPAM 0.5 MG: 0.5 TABLET ORAL at 09:40

## 2023-03-25 RX ADMIN — GABAPENTIN 800 MG: 400 CAPSULE ORAL at 21:15

## 2023-03-25 RX ADMIN — CLONAZEPAM 0.5 MG: 0.5 TABLET ORAL at 21:16

## 2023-03-25 RX ADMIN — VANCOMYCIN HYDROCHLORIDE 1000 MG: 1 INJECTION, POWDER, LYOPHILIZED, FOR SOLUTION INTRAVENOUS at 21:14

## 2023-03-25 RX ADMIN — CLONAZEPAM 0.5 MG: 0.5 TABLET ORAL at 16:38

## 2023-03-25 RX ADMIN — VANCOMYCIN HYDROCHLORIDE 1000 MG: 1 INJECTION, POWDER, LYOPHILIZED, FOR SOLUTION INTRAVENOUS at 12:48

## 2023-03-25 RX ADMIN — HYDROCODONE BITARTRATE AND ACETAMINOPHEN 1 TABLET: 7.5; 325 TABLET ORAL at 17:44

## 2023-03-25 ASSESSMENT — PAIN DESCRIPTION - ORIENTATION
ORIENTATION: RIGHT
ORIENTATION: RIGHT

## 2023-03-25 ASSESSMENT — PAIN DESCRIPTION - PAIN TYPE: TYPE: ACUTE PAIN

## 2023-03-25 ASSESSMENT — PAIN DESCRIPTION - FREQUENCY: FREQUENCY: CONTINUOUS

## 2023-03-25 ASSESSMENT — PAIN SCALES - GENERAL
PAINLEVEL_OUTOF10: 7
PAINLEVEL_OUTOF10: 3
PAINLEVEL_OUTOF10: 3
PAINLEVEL_OUTOF10: 7

## 2023-03-25 ASSESSMENT — PAIN DESCRIPTION - LOCATION
LOCATION: KNEE
LOCATION: KNEE

## 2023-03-25 ASSESSMENT — PAIN DESCRIPTION - DESCRIPTORS
DESCRIPTORS: SHARP
DESCRIPTORS: DISCOMFORT;SORE

## 2023-03-25 ASSESSMENT — PAIN DESCRIPTION - ONSET: ONSET: ON-GOING

## 2023-03-25 ASSESSMENT — PAIN - FUNCTIONAL ASSESSMENT
PAIN_FUNCTIONAL_ASSESSMENT: ACTIVITIES ARE NOT PREVENTED
PAIN_FUNCTIONAL_ASSESSMENT: PREVENTS OR INTERFERES SOME ACTIVE ACTIVITIES AND ADLS

## 2023-03-25 NOTE — PLAN OF CARE
Problem: Discharge Planning  Goal: Discharge to home or other facility with appropriate resources  3/25/2023 0813 by Nereida Nash RN  Outcome: Progressing  3/24/2023 2254 by Arline Arce RN  Outcome: Progressing     Problem: Pain  Goal: Verbalizes/displays adequate comfort level or baseline comfort level  3/25/2023 0813 by Nereida Nash RN  Outcome: Progressing  3/24/2023 2254 by Arline Arce RN  Outcome: Progressing

## 2023-03-25 NOTE — PLAN OF CARE
Problem: Discharge Planning  Goal: Discharge to home or other facility with appropriate resources  3/25/2023 1252 by John Pablo RN  Outcome: Completed  3/25/2023 0813 by John Pablo RN  Outcome: Progressing  3/24/2023 2254 by Chris Mota RN  Outcome: Progressing     Problem: Pain  Goal: Verbalizes/displays adequate comfort level or baseline comfort level  3/25/2023 1252 by John Pablo RN  Outcome: Completed  3/25/2023 0813 by John Pablo RN  Outcome: Progressing  3/24/2023 2254 by Chris Mota RN  Outcome: Progressing

## 2023-03-25 NOTE — PLAN OF CARE
Problem: Discharge Planning  Goal: Discharge to home or other facility with appropriate resources  3/24/2023 2254 by Albina Flores RN  Outcome: Progressing     Problem: Pain  Goal: Verbalizes/displays adequate comfort level or baseline comfort level  3/24/2023 2254 by Albina Flores RN  Outcome: Progressing

## 2023-03-25 NOTE — FLOWSHEET NOTE
03/25/23 0804   Vital Signs   Temp 97.3 °F (36.3 °C)   Temp Source Oral   Heart Rate 82   Heart Rate Source Monitor   Resp 18   /70   MAP (Calculated) 88   BP Location Right upper arm   BP Method Automatic   Patient Position Semi fowlers   Level of Consciousness 0   MEWS Score 1   Oxygen Therapy   SpO2 99 %   O2 Device None (Room air)      03/25/23 0820   Pain Assessment   Pain Assessment 0-10   Pain Level 7   Patient's Stated Pain Goal 3   Pain Location Knee   Pain Orientation Right   Pain Descriptors Discomfort; Sore   Functional Pain Assessment Activities are not prevented   Pain Type Acute pain   Pain Frequency Continuous   Pain Onset On-going   Non-Pharmaceutical Pain Intervention(s) Ice;Elevation     AM assessment complete. Gave am meds due. PRN pain med given. Will give clonazepam in hour. A/O x 4. Pain noted above. Boyfriend at bedside. Ice/pillow given for R knee pain. Refused lovenox. Independent. Bed locked/lowest position. Call light within reach.

## 2023-03-26 VITALS
BODY MASS INDEX: 37.44 KG/M2 | SYSTOLIC BLOOD PRESSURE: 121 MMHG | RESPIRATION RATE: 16 BRPM | DIASTOLIC BLOOD PRESSURE: 76 MMHG | OXYGEN SATURATION: 97 % | HEIGHT: 65 IN | WEIGHT: 224.7 LBS | TEMPERATURE: 97.6 F | HEART RATE: 73 BPM

## 2023-03-26 PROBLEM — M79.7 FIBROMYALGIA: Status: ACTIVE | Noted: 2023-03-26

## 2023-03-26 PROBLEM — F41.9 ANXIETY: Status: ACTIVE | Noted: 2023-03-26

## 2023-03-26 PROBLEM — G43.909 MIGRAINE: Status: ACTIVE | Noted: 2023-03-26

## 2023-03-26 PROCEDURE — 99231 SBSQ HOSP IP/OBS SF/LOW 25: CPT | Performed by: PHYSICIAN ASSISTANT

## 2023-03-26 PROCEDURE — 6360000002 HC RX W HCPCS: Performed by: INTERNAL MEDICINE

## 2023-03-26 PROCEDURE — 6370000000 HC RX 637 (ALT 250 FOR IP): Performed by: INTERNAL MEDICINE

## 2023-03-26 PROCEDURE — 2580000003 HC RX 258: Performed by: INTERNAL MEDICINE

## 2023-03-26 PROCEDURE — 6370000000 HC RX 637 (ALT 250 FOR IP)

## 2023-03-26 RX ORDER — GABAPENTIN 400 MG/1
800 CAPSULE ORAL 3 TIMES DAILY
COMMUNITY
Start: 2023-03-26

## 2023-03-26 RX ORDER — HYDROCODONE BITARTRATE AND ACETAMINOPHEN 7.5; 325 MG/1; MG/1
1 TABLET ORAL EVERY 6 HOURS PRN
Qty: 12 TABLET | Refills: 0 | Status: SHIPPED | OUTPATIENT
Start: 2023-03-26 | End: 2023-03-29

## 2023-03-26 RX ORDER — DOXYCYCLINE HYCLATE 100 MG
100 TABLET ORAL 2 TIMES DAILY
Qty: 14 TABLET | Refills: 0 | Status: SHIPPED | OUTPATIENT
Start: 2023-03-26 | End: 2023-04-02

## 2023-03-26 RX ORDER — 0.9 % SODIUM CHLORIDE 0.9 %
500 INTRAVENOUS SOLUTION INTRAVENOUS ONCE
Status: COMPLETED | OUTPATIENT
Start: 2023-03-26 | End: 2023-03-26

## 2023-03-26 RX ORDER — SODIUM CHLORIDE 9 MG/ML
INJECTION, SOLUTION INTRAVENOUS CONTINUOUS
Status: ACTIVE | OUTPATIENT
Start: 2023-03-26 | End: 2023-03-26

## 2023-03-26 RX ADMIN — SODIUM CHLORIDE: 9 INJECTION, SOLUTION INTRAVENOUS at 05:07

## 2023-03-26 RX ADMIN — VANCOMYCIN HYDROCHLORIDE 1000 MG: 1 INJECTION, POWDER, LYOPHILIZED, FOR SOLUTION INTRAVENOUS at 05:12

## 2023-03-26 RX ADMIN — GABAPENTIN 800 MG: 400 CAPSULE ORAL at 09:06

## 2023-03-26 RX ADMIN — ARIPIPRAZOLE 10 MG: 10 TABLET ORAL at 09:06

## 2023-03-26 RX ADMIN — SODIUM CHLORIDE 500 ML: 9 INJECTION, SOLUTION INTRAVENOUS at 03:32

## 2023-03-26 RX ADMIN — CLONAZEPAM 0.5 MG: 0.5 TABLET ORAL at 09:06

## 2023-03-26 RX ADMIN — Medication 10 ML: at 12:29

## 2023-03-26 RX ADMIN — VANCOMYCIN HYDROCHLORIDE 1000 MG: 1 INJECTION, POWDER, LYOPHILIZED, FOR SOLUTION INTRAVENOUS at 12:36

## 2023-03-26 RX ADMIN — CLONAZEPAM 0.5 MG: 0.5 TABLET ORAL at 16:06

## 2023-03-26 RX ADMIN — GABAPENTIN 800 MG: 400 CAPSULE ORAL at 14:10

## 2023-03-26 RX ADMIN — ESCITALOPRAM OXALATE 20 MG: 10 TABLET ORAL at 09:06

## 2023-03-26 NOTE — CARE COORDINATION
Discharge order noted. CM not following. Confirmed with Cornelio Sotelo RN that pt has no needs from CM. 97% on room air per vitals in epic. Per AVS, discharging on oral medications.      Ghulam PADRON, HODAS  Case Management Department  Phone: [phone] Fax: [phone]

## 2023-03-26 NOTE — PROGRESS NOTES
4 Eyes Skin Assessment     The patient is being assess for   Admission    I agree that 2 RN's have performed a thorough Head to Toe Skin Assessment on the patient. ALL assessment sites listed below have been assessed. Areas assessed for pressure by both nurses:   [x]   Head, Face, and Ears   [x]   Shoulders, Back, and Chest, Abdomen  [x]   Arms, Elbows, and Hands   [x]   Coccyx, Sacrum, and Ischium  [x]   Legs, Feet, and Heels        Skin Assessed Under all Medical Devices by both nurses:  Abrasions to hands, arms and legs. R knee with pin point area popped boil. Small area that was drained above R knee. Swelling & edema. All Mepilex Borders were peeled back and area peeked at by both nurses:  No: n/a  Please list where Mepilex Borders are located:  n/a             **SHARE this note so that the co-signing nurse is able to place an eSignature**    Co-signer eSignature: Electronically signed by Kasandra Montilla RN on 3/24/23 at 4:11 AM EDT    Does the Patient have Skin Breakdown related to pressure?   No     (Insert Photo here n/a)         Darian Prevention initiated:  No   Wound Care Orders initiated:  No      WOC nurse consulted for Pressure Injury (Stage 3,4, Unstageable, DTI, NWPT, Complex wounds)and New or Established Ostomies:  No      Primary Nurse eSignature: Electronically signed by Aretha Hall RN on 3/24/23 at 2:28 AM EDT
Bedside Mobility Assessment Tool (BMAT):     Assessment Level 1- Sit and Shake    1. From a semi-reclined position, ask patient to sit up and rotate to a seated position at the side of the bed. Can use the bedrail. 2. Ask patient to reach out and grab your hand and shake making sure patient reaches across his/her midline. Pass- Patient is able to come to a seated position, maintain core strength. Maintains seated balance while reaching across midline. Move on to Assessment Level 2. Assessment Level 2- Stretch and Point   1. With patient in seated position at the side of the bed, have patient place both feet on the floor (or stool) with knees no higher than hips. 2. Ask patient to stretch one leg and straighten the knee, then bend the ankle/flex and point the toes. If appropriate, repeat with the other leg. Pass- Patient is able to demonstrate appropriate quad strength on intended weight bearing limb(s). Move onto Assessment Level 3. Assessment Level 3- Stand   1. Ask patient to elevate off the bed or chair (seated to standing) using an assistive device (cane, bedrail). 2. Patient should be able to raise buttocks off be and hold for a count of five. May repeat once. Pass- Patient maintains standing stability for at least 5 seconds, proceed to assessment level 4. Assessment Level 4- Walk   1. Ask patient to march in place at bedside. 2. Then ask patient to advance step and return each foot. Some medical conditions may render a patient from stepping backwards, use your best clinical judgement. Pass- Patient demonstrates balance while shifting weight and ability to step, takes independent steps, does not use assistive device patient is MOBILITY LEVEL 4.       Mobility Level- 4
Bedside report given and pt care transferred to [facility]. Pt denies needs at this time. Call light within reach.
Department of Orthopedic Surgery  Physician Assistant   Progress Note    Subjective:       Systemic or Specific Complaints:Pain Control about the same. Focused superior knee and over superior patella today    Objective:     Patient Vitals for the past 24 hrs:   BP Temp Temp src Pulse Resp SpO2   03/25/23 0935 109/72 -- -- 72 -- --   03/25/23 0804 123/70 97.3 °F (36.3 °C) Oral 82 18 99 %   03/25/23 0145 92/60 98 °F (36.7 °C) Oral 78 18 95 %   03/24/23 2015 97/60 97.9 °F (36.6 °C) Oral 66 19 98 %   03/24/23 1423 93/62 97.3 °F (36.3 °C) Oral 70 18 95 %       General: alert, appears stated age, and cooperative   Wound: Motion: Painful range of Motion in affected extremity   DVT Exam: No evidence of DVT seen on physical exam.     Additional exam: Erythema minimal today. No open wounds . No fluctuance. Pain today mainly over suprapatellar bursa. Data Review  CBC:   Lab Results   Component Value Date/Time    WBC 6.5 03/25/2023 05:49 AM    RBC 3.84 03/25/2023 05:49 AM    HGB 10.8 03/25/2023 05:49 AM    HCT 32.0 03/25/2023 05:49 AM     03/25/2023 05:49 AM       Renal:   Lab Results   Component Value Date/Time     03/25/2023 05:49 AM    K 3.8 03/25/2023 05:49 AM     03/25/2023 05:49 AM    CO2 25 03/25/2023 05:49 AM    BUN 12 03/25/2023 05:49 AM    CREATININE 0.6 03/25/2023 05:49 AM    GLUCOSE 96 03/25/2023 05:49 AM    CALCIUM 8.6 03/25/2023 05:49 AM            Assessment:      right leg cellulitis, suprapatella bursitis. Plan:      1:  Do not see any indications at this time for surgical intervention. Would continue with IV ABX next 24 hrs and if improved can likely go home on PO ABX. Continue with ice for sweling and pain and have encourage ambulation and knee ROM today.   2:  Continue Deep venous thrombosis prophylaxis  3:  Continue Pain Control    MARY Gates
Department of Orthopedic Surgery  Physician Assistant   Progress Note    Subjective:       Systemic or Specific Complaints:Pain Control about the same. Objective:     Patient Vitals for the past 24 hrs:   BP Temp Temp src Pulse Resp SpO2   03/26/23 0813 103/60 98.1 °F (36.7 °C) Oral 75 18 96 %   03/26/23 0503 110/74 -- -- 67 -- --   03/26/23 0245 (!) 88/52 -- -- -- -- --   03/26/23 0244 (!) 83/54 97.5 °F (36.4 °C) Oral 65 20 96 %   03/25/23 2100 (!) 99/59 97.4 °F (36.3 °C) Oral 71 18 96 %   03/25/23 1744 -- -- -- -- 18 --   03/25/23 1251 123/76 98.1 °F (36.7 °C) Oral 76 16 99 %         General: alert, appears stated age, and cooperative   Wound: Motion: Painful range of Motion in affected extremity   DVT Exam: No evidence of DVT seen on physical exam.     Additional exam: Erythema minimal today. No open wounds . No fluctuance. Pain today mainly over suprapatellar bursa. Data Review  CBC:   Lab Results   Component Value Date/Time    WBC 6.5 03/25/2023 05:49 AM    RBC 3.84 03/25/2023 05:49 AM    HGB 10.8 03/25/2023 05:49 AM    HCT 32.0 03/25/2023 05:49 AM     03/25/2023 05:49 AM       Renal:   Lab Results   Component Value Date/Time     03/25/2023 05:49 AM    K 3.8 03/25/2023 05:49 AM     03/25/2023 05:49 AM    CO2 25 03/25/2023 05:49 AM    BUN 12 03/25/2023 05:49 AM    CREATININE 0.6 03/25/2023 05:49 AM    GLUCOSE 96 03/25/2023 05:49 AM    CALCIUM 8.6 03/25/2023 05:49 AM            Assessment:      right leg cellulitis, suprapatella bursitis. Plan:      1:  Do not see any indications at this time for surgical intervention. Okay to d/c to home from ortho standpoint. Continue with ice for sweling and pain and have encourage ambulation and knee ROM today.   2:  Continue Deep venous thrombosis prophylaxis  3:  Continue Pain Control    MARY Ray
Notified [facility] and Dr Montilla vanc infusion infiltrated LFA. Per Arnold Castellon [facility] warm or cold compresses as needed for discomfort.
Order received for bolus  ml over 1 hour and  ml/hr.
Patient currently resting in bed with eyes closed. Alert and oriented x 4. Up ambulating independently and tolerating well. Has had no complaints of pain or discomfort. Patient in no distress. Nurse will continue to monitor/reassess. Call light within reach.
Patient is able to demonstrate the ability to move from a reclining position to an upright position within the recliner.
Patients bp manually is 88/52. Dr. Papo Chao sent perfect serve message to request order for bolus.
Prescriptions: [address] and discharge instructions given. Pt verbalized understanding denies any questions/ needs at this time. Pt walked off floor with family to vehicle for discharge home.
Progress Note    Admit Date:  3/23/2023    -pmhx of depression, drug abuse  -right knee pain and swelling       Subjective:  Ms. Yeison Wolff today is seen sitting in bed, feeling okay but still with right knee pain and swelling. Objective:   Patient Vitals for the past 4 hrs:   BP Temp Temp src Pulse Resp SpO2   03/24/23 0842 (!) 93/59 98 °F (36.7 °C) Oral 68 16 96 %        No intake or output data in the 24 hours ending 03/24/23 1047    Physical Exam:    Gen: No distress. Alert. +Young female   Eyes: PERRL. No sclera icterus. No conjunctival injection. Neck: No JVD. Trachea midline. Resp: No accessory muscle use. No crackles. No wheezes. No rhonchi. CV: Regular rate. Regular rhythm. No murmur. No rub. No edema. Peripheral Pulses: +2 palpable, equal bilaterally   GI: Non-tender. Non-distended. Normal bowel sounds. Skin: Warm and dry. No nodule on exposed extremities. No rash on exposed extremities. M/S: No cyanosis. No joint deformity. No clubbing. +right knee with some erythema,edema and warmth, tenderness to palpation, full active ROM   Neuro: Awake. Grossly nonfocal    Psych: Oriented x 3. No anxiety or agitation.          Medications:  ARIPiprazole, 10 mg, Daily  escitalopram, 20 mg, Daily  sodium chloride flush, 5-40 mL, 2 times per day  enoxaparin, 40 mg, Daily  vancomycin, 1,000 mg, Q8H  clonazePAM, 0.5 mg, TID    PRN Medications:  sodium chloride flush, 5-40 mL, PRN  sodium chloride, , PRN  ondansetron, 4 mg, Q8H PRN   Or  ondansetron, 4 mg, Q6H PRN  polyethylene glycol, 17 g, Daily PRN  acetaminophen, 650 mg, Q6H PRN   Or  acetaminophen, 650 mg, Q6H PRN  HYDROcodone-acetaminophen, 1 tablet, Q6H PRN        Data:  CBC:   Recent Labs     03/23/23 1946 03/24/23  0556   WBC 7.6 6.6   HGB 12.4 11.3*   HCT 36.7 33.9*   MCV 82.3 83.8    343     BMP:   Recent Labs     03/23/23 1946 03/24/23  0555    141   K 3.8 3.7    106   CO2 23 27   BUN 13 14   CREATININE 0.7 0.7     LIVER
Pt resting in bed with eyes closed. Pt's boyfriend on the couch.
Pt's boyfriend in bed with the pt. Pt's boyfriend warned not to be in bed with the pt. It is the pt's bed and she needs to be able to rest. Pt's boyfriend was asked to return to the couch. The boyfriend did return to the couch.
Pt's boyfriend was given the option to go home or stay just tonight in pt's room. The boyfriend was told per clinical that he is only permitted to stay tonight and must find a ride by Friday at 2000 when visiting hours are over. Pt stated that I'm am his ride. Pt's boyfriend was also told that he has to stay out of his girlfriend's bed. He is to stay on the couch. He verbalized that he would do that. Pt smokes frequently and he was informed that he can not go back & forth at night because the doors are locked.
Transferred care to [facility]. Face to face bedside report given, no need voiced at this time.
650 mg, Q6H PRN  HYDROcodone-acetaminophen, 1 tablet, Q6H PRN        Data:  CBC:   Recent Labs     03/23/23 1946 03/24/23  0556 03/25/23  0549   WBC 7.6 6.6 6.5   HGB 12.4 11.3* 10.8*   HCT 36.7 33.9* 32.0*   MCV 82.3 83.8 83.2    343 335       BMP:   Recent Labs     03/23/23 1946 03/24/23  0555 03/25/23  0549    141 140   K 3.8 3.7 3.8    106 106   CO2 23 27 25   BUN 13 14 12   CREATININE 0.7 0.7 0.6       LIVER PROFILE:   Recent Labs     03/23/23 1946   AST 17   ALT 19   BILITOT <0.2   ALKPHOS 73       PT/INR: No results for input(s): PROTIME, INR in the last 72 hours. CULTURES  COVID and Flu not detected   Blood culture pending   Knee aspiration fluid culture pending     RADIOLOGY  XR KNEE RIGHT (3 VIEWS)   Final Result   Joint effusion but no osseous erosions         POC US EXTREMITY NON VASC LIMITED   Final Result            Assessment/Plan:      #Right right lower extremity cellulitis  Right knee suprapatellar bursitis  -was on doxycycline for 2 days   -afebrile, no WBC   -elevated ESR and CRP   -joint aspiration performed, culture pending unsure if obtained correctly and if results are reliable  -blood cultures pending   -IV vancomycin, does not tolerate cephalosporins -likely staph aureus   -norco prn for pain   -ortho consulted and following     Continue IV antibiotics vancomycin follow-up on cultures    #Hypotension   -reports BP is chronically low   -IVF  -monitor     #Anxiety   -on klonopin     #Migraines  -follows with neurology     #Fibromyalgia   -on gabapentin 800 TID, OARRS verified     #Hx of drug abuse   -been clean for 2 years   -not on suboxone any more     Per ortho no surgical intervention at this time, continue IV antibiotics . can likely discharge home on oral antibiotics tomorrow     DVT Prophylaxis: Lovenox   Diet: ADULT DIET;  Regular  Code Status: Full Code    Improved  Discharge home on oral antibiotics    Gretchen Pedroza MD  03/26/23  11:58 AM
not use assistive device patient is MOBILITY LEVEL 4.       Mobility Level- 4
MD  03/25/23  4:10 PM

## 2023-03-26 NOTE — PLAN OF CARE
Problem: Discharge Planning  Goal: Discharge to home or other facility with appropriate resources  3/25/2023 1252 by Vince Varela RN  Outcome: Completed

## 2023-03-26 NOTE — FLOWSHEET NOTE
03/26/23 0813   Vital Signs   Temp 98.1 °F (36.7 °C)   Temp Source Oral   Heart Rate 75   Heart Rate Source Monitor   Resp 18   /60   MAP (Calculated) 74   BP Location Left upper arm   BP Method Automatic   Patient Position Semi fowlers   Level of Consciousness 0   MEWS Score 1   Oxygen Therapy   SpO2 96 %   O2 Device None (Room air)     AM assessment complete. Gave am meds due see mar. A/O x 4. Encourage ambulation. Pain level 3/10 located R knee-scheduled pain med given. Independent. Bed locked/lowest position. Call light within reach.

## 2023-03-26 NOTE — PLAN OF CARE
Problem: Discharge Planning  Goal: Discharge to home or other facility with appropriate resources  3/26/2023 1252 by Roby Kaiser RN  Outcome: Completed  3/26/2023 0912 by Roby Kaiser RN  Outcome: Progressing

## 2023-03-26 NOTE — DISCHARGE INSTRUCTIONS
Continue with weight bearing as tolerated. Encouraged knee range of motion. Ice to the knee for swelling. Ibuprofen and tylenol for pain control. Can f/u with PCP for recheck in a week.

## 2023-03-27 LAB
BACTERIA BLD CULT ORG #2: NORMAL
BACTERIA BLD CULT: NORMAL
BACTERIA FLD AEROBE CULT: NORMAL
GRAM STN SPEC: NORMAL

## 2023-03-27 NOTE — DISCHARGE SUMMARY
Name:  Marcela Romeo  Room:  0218/0218-01  MRN:    [id number]    Discharge Summary      This discharge summary is in conjunction with a complete physical exam done on the day of discharge. Discharging Provider: Dr. Mila Parada MD      Admit: 3/23/2023  Discharge: 03/26/23      HPI taken from admission H&P:    29 y.o. female who presented to the hospital with a chief complaint of right knee pain and swelling. Patient states that she developed a boil over her right knee that popped. Soon after that she developed swelling and redness over the right knee. She went to an urgent care where she was prescribed doxycycline, she took this for 2 days but noticed that the redness and swelling had increased. She came to the emergency department to get evaluated. She endorses pain with knee flexion. She also endorses subjective fevers and chills. She denies any IV drug use or any trauma to that area except for the boil which she had on the surface of the knee. Emergency department arthrocentesis was performed and cultures were sent to the lab. She will be admitted for IV antibiotic therapy and orthopedic evaluation.     Diagnoses this Admission and Hospital Course   #Right right lower extremity cellulitis  Right knee suprapatellar bursitis  -was on doxycycline for 2 days   -afebrile, no WBC   -elevated ESR and CRP   -joint aspiration performed, culture pending unsure if obtained correctly and if results are reliable  -blood cultures pending   -IV vancomycin, does not tolerate cephalosporins -likely staph aureus   -norco prn for pain   -ortho consulted and following      Continue IV antibiotics vancomycin follow-up on cultures     #Hypotension   -reports BP is chronically low   -IVF  -monitor      #Anxiety   -on klonopin      #Migraines  -follows with neurology      #Fibromyalgia   -on gabapentin 800 TID, OARRS verified      #Hx of drug abuse   -been clean for 2 years   -not on suboxone any more      Per ortho no

## 2023-04-03 LAB
BACTERIA FLD AEROBE CULT: NORMAL
GRAM STN SPEC: NORMAL

## 2024-10-17 NOTE — PROGRESS NOTES
Also needs her Adrian 3 sensors.    Orders Placed This Encounter   Medications    Insulin Pen Needle 32G X 4 MM MISC     Si each by Does not apply route in the morning, at noon, in the evening, and at bedtime     Dispense:  200 each     Refill:  0    insulin lispro, 1 Unit Dial, (HUMALOG KWIKPEN) 100 UNIT/ML SOPN     Sig: Inject 15 Units into the skin 3 times daily (before meals) Plus sliding scale:< 140 No insulin,140-199 1 unit, 200-249 2 units, 250-299 3 units, 300-349 4 units,350-400 5 units, >400 6 units     Dispense:  5 Adjustable Dose Pre-filled Pen Syringe     Refill:  0    insulin glargine (LANTUS SOLOSTAR) 100 UNIT/ML injection pen     Sig: Inject 31 Units into the skin nightly     Dispense:  5 Adjustable Dose Pre-filled Pen Syringe     Refill:  0    Continuous Glucose Sensor (FREESTYLE ADRIAN 3 SENSOR) MISC     Si Device by Does not apply route every 14 days     Dispense:  2 each     Refill:  0     A1c 9.7 (2024) MEÑO Zuniga, PharmROLANDO  [facility]   [facility]  Diabetes Service  [phone]    For Pharmacy Admin Tracking Only    Program: Medication Management  CPA in place:  Yes  Recommendation Provided To: Pharmacy: 1  Intervention Detail: New Rx: 1, reason: Needs Additional Therapy  Intervention Accepted By: Pharmacy: 1  Gap Closed?: Yes   Time Spent (min): 10      RN called to room at this time. Pt stating that she has a pressure-type pain in the back of her head, rated as a 6/10 severity, that started when she was up in the shower. Pain radiates down her throat and to her upper chest and back. Pain in throat, chest and back is described as a burning. Pt also reports feeling diaphoretic. Pt denies SOB or trouble breathing. Temp 98 oral. Pt has had nasal congestion all of pregnancy. RN asked whether it feels like secretions are dripping down pt throat causing the burning and pt does not think that is the cause. RN asked whether she thinks HA is caused by nicotine or caffeine withdrawal and pt also does not think this is the cause. Pt asked if she has a history of migraine HA or if this feels like a migraine and she also denies this. Pt states \"I am not looking for pain medications I just want to know what is going on. I have never had anything like this before\". Ice packs applied to upper back and lights dimmed. Pt reports that ice backs have decreased burning sensation in back to a 4/10 severity. Chuckie ARANGO notified of REEVES. Chuckie reviewed pt chart and is not concerned for risk of spinal HA due to ease of insertion of epidural and generalization of pt symptoms. RN will notify provider and update pt on plan of care. Accepted By: Patient/Caregiver: 1 and Pharmacy: 3  Gap Closed?: Yes   Time Spent (min): 15